# Patient Record
Sex: FEMALE | Race: WHITE | NOT HISPANIC OR LATINO | Employment: FULL TIME | URBAN - METROPOLITAN AREA
[De-identification: names, ages, dates, MRNs, and addresses within clinical notes are randomized per-mention and may not be internally consistent; named-entity substitution may affect disease eponyms.]

---

## 2017-01-03 DIAGNOSIS — F98.8 ADD (ATTENTION DEFICIT DISORDER): ICD-10-CM

## 2017-01-03 RX ORDER — LISDEXAMFETAMINE DIMESYLATE 70 MG/1
CAPSULE ORAL
Qty: 30 CAPSULE | Refills: 0 | Status: SHIPPED | OUTPATIENT
Start: 2017-01-03 | End: 2017-02-23 | Stop reason: SDUPTHER

## 2017-01-18 ENCOUNTER — PATIENT MESSAGE (OUTPATIENT)
Dept: INTERNAL MEDICINE | Facility: CLINIC | Age: 37
End: 2017-01-18

## 2017-01-19 ENCOUNTER — TELEPHONE (OUTPATIENT)
Dept: INTERNAL MEDICINE | Facility: CLINIC | Age: 37
End: 2017-01-19

## 2017-01-19 ENCOUNTER — PATIENT MESSAGE (OUTPATIENT)
Dept: INTERNAL MEDICINE | Facility: CLINIC | Age: 37
End: 2017-01-19

## 2017-01-19 ENCOUNTER — LAB VISIT (OUTPATIENT)
Dept: LAB | Facility: HOSPITAL | Age: 37
End: 2017-01-19
Attending: FAMILY MEDICINE
Payer: COMMERCIAL

## 2017-01-19 DIAGNOSIS — R61 HYPERHIDROSIS: ICD-10-CM

## 2017-01-19 DIAGNOSIS — R61 HYPERHIDROSIS: Primary | ICD-10-CM

## 2017-01-19 LAB
T4 FREE SERPL-MCNC: 0.98 NG/DL
TSH SERPL DL<=0.005 MIU/L-ACNC: 2.63 UIU/ML

## 2017-01-19 PROCEDURE — 84439 ASSAY OF FREE THYROXINE: CPT

## 2017-01-19 PROCEDURE — 84443 ASSAY THYROID STIM HORMONE: CPT

## 2017-01-19 PROCEDURE — 36415 COLL VENOUS BLD VENIPUNCTURE: CPT | Mod: PO

## 2017-01-26 ENCOUNTER — OFFICE VISIT (OUTPATIENT)
Dept: INTERNAL MEDICINE | Facility: CLINIC | Age: 37
End: 2017-01-26
Payer: COMMERCIAL

## 2017-01-26 VITALS
HEART RATE: 88 BPM | BODY MASS INDEX: 37.83 KG/M2 | DIASTOLIC BLOOD PRESSURE: 80 MMHG | WEIGHT: 221.56 LBS | SYSTOLIC BLOOD PRESSURE: 120 MMHG | HEIGHT: 64 IN | TEMPERATURE: 97 F

## 2017-01-26 DIAGNOSIS — F98.8 ADD (ATTENTION DEFICIT DISORDER): Primary | ICD-10-CM

## 2017-01-26 DIAGNOSIS — F41.1 ANXIETY STATE: ICD-10-CM

## 2017-01-26 DIAGNOSIS — R61 HYPERHIDROSIS: ICD-10-CM

## 2017-01-26 PROCEDURE — 99999 PR PBB SHADOW E&M-EST. PATIENT-LVL III: CPT | Mod: PBBFAC,,, | Performed by: FAMILY MEDICINE

## 2017-01-26 PROCEDURE — 1159F MED LIST DOCD IN RCRD: CPT | Mod: S$GLB,,, | Performed by: FAMILY MEDICINE

## 2017-01-26 PROCEDURE — 99214 OFFICE O/P EST MOD 30 MIN: CPT | Mod: S$GLB,,, | Performed by: FAMILY MEDICINE

## 2017-01-26 RX ORDER — CYCLOBENZAPRINE HCL 10 MG
10 TABLET ORAL EVERY 6 HOURS PRN
Refills: 0 | COMMUNITY
Start: 2016-12-28 | End: 2017-07-27 | Stop reason: SDUPTHER

## 2017-01-26 RX ORDER — VENLAFAXINE 75 MG/1
TABLET ORAL
Qty: 90 TABLET | Refills: 5 | Status: SHIPPED | OUTPATIENT
Start: 2017-01-26 | End: 2017-10-10

## 2017-01-26 RX ORDER — OXYCODONE AND ACETAMINOPHEN 7.5; 325 MG/1; MG/1
1 TABLET ORAL EVERY 6 HOURS PRN
Refills: 0 | COMMUNITY
Start: 2016-12-28 | End: 2017-01-26
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
## 2017-02-23 DIAGNOSIS — F98.8 ADD (ATTENTION DEFICIT DISORDER): ICD-10-CM

## 2017-02-23 RX ORDER — LISDEXAMFETAMINE DIMESYLATE 70 MG/1
CAPSULE ORAL
Qty: 30 CAPSULE | Refills: 0 | Status: SHIPPED | OUTPATIENT
Start: 2017-02-23 | End: 2017-03-24 | Stop reason: SDUPTHER

## 2017-03-14 ENCOUNTER — OFFICE VISIT (OUTPATIENT)
Dept: URGENT CARE | Facility: CLINIC | Age: 37
End: 2017-03-14
Payer: COMMERCIAL

## 2017-03-14 VITALS
DIASTOLIC BLOOD PRESSURE: 80 MMHG | TEMPERATURE: 98 F | WEIGHT: 228.38 LBS | SYSTOLIC BLOOD PRESSURE: 130 MMHG | HEART RATE: 98 BPM | OXYGEN SATURATION: 98 % | BODY MASS INDEX: 39.2 KG/M2

## 2017-03-14 DIAGNOSIS — A08.4 VIRAL GASTROENTERITIS: Primary | ICD-10-CM

## 2017-03-14 DIAGNOSIS — R11.2 NAUSEA VOMITING AND DIARRHEA: ICD-10-CM

## 2017-03-14 DIAGNOSIS — R10.32 LEFT LOWER QUADRANT PAIN: ICD-10-CM

## 2017-03-14 DIAGNOSIS — R19.7 NAUSEA VOMITING AND DIARRHEA: ICD-10-CM

## 2017-03-14 PROCEDURE — 1160F RVW MEDS BY RX/DR IN RCRD: CPT | Mod: S$GLB,,, | Performed by: NURSE PRACTITIONER

## 2017-03-14 PROCEDURE — 99999 PR PBB SHADOW E&M-EST. PATIENT-LVL III: CPT | Mod: PBBFAC,,, | Performed by: NURSE PRACTITIONER

## 2017-03-14 PROCEDURE — 99213 OFFICE O/P EST LOW 20 MIN: CPT | Mod: S$GLB,,, | Performed by: NURSE PRACTITIONER

## 2017-03-14 PROCEDURE — S0119 ONDANSETRON 4 MG: HCPCS | Mod: S$GLB,,, | Performed by: NURSE PRACTITIONER

## 2017-03-14 RX ORDER — PROMETHAZINE HYDROCHLORIDE 25 MG/1
25 TABLET ORAL EVERY 4 HOURS PRN
Qty: 20 TABLET | Refills: 0 | Status: SHIPPED | OUTPATIENT
Start: 2017-03-14 | End: 2017-10-10

## 2017-03-14 RX ORDER — ONDANSETRON 4 MG/1
4 TABLET, ORALLY DISINTEGRATING ORAL
Status: COMPLETED | OUTPATIENT
Start: 2017-03-14 | End: 2017-03-14

## 2017-03-14 RX ADMIN — ONDANSETRON 4 MG: 4 TABLET, ORALLY DISINTEGRATING ORAL at 09:03

## 2017-03-14 NOTE — MR AVS SNAPSHOT
[facility]  [address]  [place] [address]  Phone: [phone]  Fax: [phone]                  Neeru Gomez   3/14/2017 8:50 AM   Office Visit    Description:  Female : 1980   Provider:  XIOMARA Blanco   Department:  [facility]           Reason for Visit     Abdominal Pain           Diagnoses this Visit        Comments    Viral gastroenteritis    -  Primary     Nausea vomiting and diarrhea         Left lower quadrant pain                To Do List           Future Appointments        Provider Department Dept Phone    2017 2:00 PM [facility] [phone]      Goals (5 Years of Data)     None      Follow-Up and Disposition     Return if symptoms worsen or fail to improve.       These Medications        Disp Refills Start End    promethazine (PHENERGAN) 25 MG tablet 20 tablet 0 3/14/2017     Take 1 tablet (25 mg total) by mouth every 4 (four) hours as needed for Nausea. - Oral    Pharmacy: [facility] [place]  [address] [address]  #: [phone]         [facility] On Call     [facility] On Call Nurse Care Line -  Assistance  Registered nurses in the [facility] On Call Center provide clinical advisement, health education, appointment booking, and other advisory services.  Call for this free service at [phone].             Medications           Message regarding Medications     Verify the changes and/or additions to your medication regime listed below are the same as discussed with your clinician today.  If any of these changes or additions are incorrect, please notify your healthcare provider.        START taking these NEW medications        Refills    promethazine (PHENERGAN) 25 MG tablet 0    Sig: Take 1 tablet (25 mg total) by mouth every 4 (four) hours as needed for Nausea.    Class: Normal    Route: Oral      These medications were  administered today        Dose Freq    ondansetron disintegrating tablet 4 mg 4 mg Clinic/HOD 1 time    Sig: Take 1 tablet (4 mg total) by mouth one time.    Class: Normal    Route: Oral      STOP taking these medications     promethazine-dextromethorphan (PROMETHAZINE-DM) 6.25-15 mg/5 mL Syrp Take 5 mLs by mouth every 6 (six) hours as needed.           Verify that the below list of medications is an accurate representation of the medications you are currently taking.  If none reported, the list may be blank. If incorrect, please contact your healthcare provider. Carry this list with you in case of emergency.           Current Medications     albuterol (ACCUNEB) 0.63 mg/3 mL Nebu Take 3 mLs (0.63 mg total) by nebulization every 6 (six) hours as needed.    cyclobenzaprine (FLEXERIL) 10 MG tablet Take 10 mg by mouth every 6 (six) hours as needed.    fluticasone-salmeterol 250-50 mcg/dose (ADVAIR DISKUS) 250-50 mcg/dose diskus inhaler Inhale 1 puff into the lungs 2 (two) times daily.    gabapentin (NEURONTIN) 300 MG capsule take 1 capsule at bedtime    montelukast (SINGULAIR) 10 mg tablet Take 1 tablet (10 mg total) by mouth once daily.    promethazine (PHENERGAN) 25 MG tablet Take 1 tablet (25 mg total) by mouth every 4 (four) hours as needed for Nausea.    tramadol (ULTRAM) 50 mg tablet Take 1 tablet (50 mg total) by mouth every 6 (six) hours as needed for Pain.    triamcinolone (NASACORT) 55 mcg nasal inhaler 2 sprays by Nasal route once daily.    venlafaxine (EFFEXOR) 75 MG tablet 2 tabs po AM and 1 tab po midday    VYVANSE 70 mg capsule take 1 capsule daily           Clinical Reference Information           Your Vitals Were     BP Pulse Temp    130/80 (BP Location: Left arm, Patient Position: Sitting, BP Method: Manual) 98 97.6 °F (36.4 °C) (Tympanic)    Weight SpO2 BMI    103.6 kg (228 lb 6.3 oz) 98% 39.2 kg/m2      Blood Pressure          Most Recent Value    BP  130/80      Allergies as of 3/14/2017      Hydrocodone      Immunizations Administered on Date of Encounter - 3/14/2017     None      Instructions    Follow clear liquid diet guidelines, may progress to bland diet as tolerated.   Drink a small sips of clear liquids every 15-20 minutes to re-hydrate yourself.    See PCP or go to ER if decreased urination, inability to hold down fluids, if any dizziness or lightheadedness occurs, or if symptoms worsen of fail to improve with treatment.        Diet for Vomiting or Diarrhea (Adult)    Your symptoms may return or get worse after eating certain foods listed below. If this happens, stop eating these foods until your symptoms ease and you feel better.  Once the vomiting stops, follow the steps below.   During the first 12 to 24 hours  During the first 12 to 24 hours, follow this diet:  · Drinks. Plain water, sport drinks like electrolyte solutions, soft drinks without caffeine, mineral water (plain or flavored), clear fruit juices, and decaffeinated tea and coffee.  · Soups. Clear broth.  · Desserts. Plain gelatin, popsicles, and fruit juice bars. As you feel better, you may add 6 to 8 ounces of yogurt per day. If you have diarrhea, don't have foods or drinks that contain sugar, high-fructose corn syrup, or sugar alcohols.  During the next 24 hours  During the next 24 hours you may add the following to the above:  · Hot cereal, plain toast, bread, rolls, and crackers  · Plain noodles, rice, mashed potatoes, and chicken noodle or rice soup  · Unsweetened canned fruit (but not pineapple) and bananas  Don't eat more than 15 grams of fat a day. Do this by staying away from margarine, butter, oils, mayonnaise, sauces, gravies, fried foods, peanut butter, meat, poultry, and fish.  Don't eat much fiber. Stay away from raw or cooked vegetables, fresh fruits (except bananas), and bran cereals.  Limit how much caffeine and chocolate you have. Do not use any spices or seasonings except salt.  During the next 24 hours  Slowly  go back to your normal diet, as you feel better and your symptoms ease.  Date Last Reviewed: 8/1/2016  © 2557-5712 [employer]. [address] [place] [address], [place] [address]. All rights reserved. This information is not intended as a substitute for professional medical care. Always follow your healthcare professional's instructions.             Language Assistance Services     ATTENTION: Language assistance services are available, free of charge. Please call [phone].      ATENCIÓN: Si habla reggie, tiene a gonsalves disposición servicios gratuitos de asistencia lingüística. Llame al [phone].     CHÚ Ý: N?u b?n nói Ti?ng Vi?t, có các d?ch v? h? tr? ngôn ng? mi?n phí dành cho b?n. G?i s? [phone].         [facility] complies with applicable Federal civil rights laws and does not discriminate on the basis of race, color, national origin, age, disability, or sex.

## 2017-03-14 NOTE — PROGRESS NOTES
Subjective:       Patient ID: Neeru Gomez is a 36 y.o. female.    Chief Complaint: Abdominal Pain    HPI Comments: Patient started 2 days ago with nausea, vomiting, and diarrhea. 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 Symptoms started shortly after eating a salad from [employer]. No recent travel. No recent antibiotics. Last vomit was 8am. Last diarrhea was 8am also. She has decreased appetite and pain to the LLQ. She tried otc diarrhea medications with no improvement. No fever. Patient has a hx of hemorrhoids after her last pregnancy and feels that her hemorrhoids are flaring. She did see small amount of blood in her stool last night from her hemorrhoids. She uses otc preparation H with improvement.       /80 (BP Location: Left arm, Patient Position: Sitting, BP Method: Manual)  Pulse 98  Temp 97.6 °F (36.4 °C) (Tympanic)   Wt 103.6 kg (228 lb 6.3 oz)  SpO2 98%  BMI 39.2 kg/m2    Review of Systems   Constitutional: Positive for activity change, appetite change and fatigue. Negative for chills, diaphoresis, fever and unexpected weight change.   HENT: Negative.    Respiratory: Negative for cough and shortness of breath.    Cardiovascular: Negative for chest pain, palpitations and leg swelling.   Gastrointestinal: Positive for abdominal pain, blood in stool, diarrhea, nausea, rectal pain and vomiting. Negative for abdominal distention, anal bleeding and constipation.   Genitourinary: Negative.    Musculoskeletal: Negative.    Skin: Negative for color change, pallor, rash and wound.   Allergic/Immunologic: Negative for immunocompromised state.   Neurological: Negative.  Negative for dizziness and facial asymmetry.   Hematological: Negative for adenopathy. Does not bruise/bleed easily.   Psychiatric/Behavioral: Negative for agitation, behavioral problems and confusion.       Objective:      Physical Exam   Constitutional: She appears well-developed and well-nourished. She  is cooperative. No distress.   HENT:   Head: Normocephalic and atraumatic.   Cardiovascular: Normal rate, regular rhythm and normal heart sounds.    No murmur heard.  Pulmonary/Chest: Effort normal and breath sounds normal. No respiratory distress.   Abdominal: Normal appearance. She exhibits no shifting dullness, no distension, no pulsatile liver, no fluid wave, no abdominal bruit, no ascites, no pulsatile midline mass and no mass. Bowel sounds are increased. There is tenderness in the left lower quadrant. There is no rigidity, no rebound, no guarding, no CVA tenderness, no tenderness at McBurney's point and negative Granger's sign.   Musculoskeletal: Normal range of motion.   Neurological: She is alert.   Skin: Skin is warm and dry. No rash noted. She is not diaphoretic.   Psychiatric: She has a normal mood and affect. Her behavior is normal. Judgment and thought content normal.   Nursing note and vitals reviewed.      Assessment:       1. Viral gastroenteritis    2. Nausea vomiting and diarrhea    3. Left lower quadrant pain        Plan:       Neeru Paul was seen today for abdominal pain.    Diagnoses and all orders for this visit:    Viral gastroenteritis  -     ondansetron disintegrating tablet 4 mg; Take 1 tablet (4 mg total) by mouth one time.  -     promethazine (PHENERGAN) 25 MG tablet; Take 1 tablet (25 mg total) by mouth every 4 (four) hours as needed for Nausea.    Nausea vomiting and diarrhea  -     ondansetron disintegrating tablet 4 mg; Take 1 tablet (4 mg total) by mouth one time.  -     promethazine (PHENERGAN) 25 MG tablet; Take 1 tablet (25 mg total) by mouth every 4 (four) hours as needed for Nausea.    Left lower quadrant pain  [place] diet  Fluids  Diet handout given  ER if LLQ abdominal pain worsens or fever, discussed diverticulitis but risk factors are low. Patient wants to monitor pain, push fluids, and will go to ER if pain worsens.

## 2017-03-14 NOTE — PATIENT INSTRUCTIONS
Follow clear liquid diet guidelines, may progress to bland diet as tolerated.   Drink a small sips of clear liquids every 15-20 minutes to re-hydrate yourself.    See PCP or go to ER if decreased urination, inability to hold down fluids, if any dizziness or lightheadedness occurs, or if symptoms worsen of fail to improve with treatment.        Diet for Vomiting or Diarrhea (Adult)    Your symptoms may return or get worse after eating certain foods listed below. If this happens, stop eating these foods until your symptoms ease and you feel better.  Once the vomiting stops, follow the steps below.   During the first 12 to 24 hours  During the first 12 to 24 hours, follow this diet:  · Drinks. Plain water, sport drinks like electrolyte solutions, soft drinks without caffeine, mineral water (plain or flavored), clear fruit juices, and decaffeinated tea and coffee.  · Soups. Clear broth.  · Desserts. Plain gelatin, popsicles, and fruit juice bars. As you feel better, you may add 6 to 8 ounces of yogurt per day. If you have diarrhea, don't have foods or drinks that contain sugar, high-fructose corn syrup, or sugar alcohols.  During the next 24 hours  During the next 24 hours you may add the following to the above:  · Hot cereal, plain toast, bread, rolls, and crackers  · Plain noodles, rice, mashed potatoes, and chicken noodle or rice soup  · Unsweetened canned fruit (but not pineapple) and bananas  Don't eat more than 15 grams of fat a day. Do this by staying away from margarine, butter, oils, mayonnaise, sauces, gravies, fried foods, peanut butter, meat, poultry, and fish.  Don't eat much fiber. Stay away from raw or cooked vegetables, fresh fruits (except bananas), and bran cereals.  Limit how much caffeine and chocolate you have. Do not use any spices or seasonings except salt.  During the next 24 hours  Slowly go back to your normal diet, as you feel better and your symptoms ease.  Date Last Reviewed: 8/1/2016  ©  1830-0960 [employer]. [address] [place] [address], [place] [address]. All rights reserved. This information is not intended as a substitute for professional medical care. Always follow your healthcare professional's instructions.

## 2017-03-24 DIAGNOSIS — F98.8 ADD (ATTENTION DEFICIT DISORDER): ICD-10-CM

## 2017-03-24 RX ORDER — LISDEXAMFETAMINE DIMESYLATE 70 MG/1
CAPSULE ORAL
Qty: 30 CAPSULE | Refills: 0 | Status: SHIPPED | OUTPATIENT
Start: 2017-03-24 | End: 2017-05-03 | Stop reason: SDUPTHER

## 2017-05-03 ENCOUNTER — OFFICE VISIT (OUTPATIENT)
Dept: URGENT CARE | Facility: CLINIC | Age: 37
End: 2017-05-03
Payer: COMMERCIAL

## 2017-05-03 VITALS
HEIGHT: 64 IN | BODY MASS INDEX: 38.42 KG/M2 | TEMPERATURE: 100 F | HEART RATE: 98 BPM | RESPIRATION RATE: 18 BRPM | OXYGEN SATURATION: 98 % | WEIGHT: 225.06 LBS | DIASTOLIC BLOOD PRESSURE: 82 MMHG | SYSTOLIC BLOOD PRESSURE: 122 MMHG

## 2017-05-03 DIAGNOSIS — F98.8 ADD (ATTENTION DEFICIT DISORDER): ICD-10-CM

## 2017-05-03 DIAGNOSIS — J02.0 STREP PHARYNGITIS: ICD-10-CM

## 2017-05-03 DIAGNOSIS — J02.9 SORE THROAT: Primary | ICD-10-CM

## 2017-05-03 LAB
CTP QC/QA: YES
CTP QC/QA: YES
FLUAV AG NPH QL: NEGATIVE
FLUBV AG NPH QL: NEGATIVE
S PYO RRNA THROAT QL PROBE: POSITIVE

## 2017-05-03 PROCEDURE — 1160F RVW MEDS BY RX/DR IN RCRD: CPT | Mod: S$GLB,,, | Performed by: NURSE PRACTITIONER

## 2017-05-03 PROCEDURE — 87804 INFLUENZA ASSAY W/OPTIC: CPT | Mod: QW,S$GLB,, | Performed by: NURSE PRACTITIONER

## 2017-05-03 PROCEDURE — 87880 STREP A ASSAY W/OPTIC: CPT | Mod: QW,S$GLB,, | Performed by: NURSE PRACTITIONER

## 2017-05-03 PROCEDURE — 99214 OFFICE O/P EST MOD 30 MIN: CPT | Mod: 25,S$GLB,, | Performed by: NURSE PRACTITIONER

## 2017-05-03 RX ORDER — LISDEXAMFETAMINE DIMESYLATE 70 MG/1
70 CAPSULE ORAL DAILY
Qty: 30 CAPSULE | Refills: 0 | Status: SHIPPED | OUTPATIENT
Start: 2017-05-03 | End: 2017-07-27 | Stop reason: SDUPTHER

## 2017-05-03 RX ORDER — PENICILLIN V POTASSIUM 500 MG/1
500 TABLET, FILM COATED ORAL 2 TIMES DAILY
Qty: 20 TABLET | Refills: 0 | Status: SHIPPED | OUTPATIENT
Start: 2017-05-03 | End: 2017-05-13

## 2017-05-03 RX ORDER — IBUPROFEN 800 MG/1
800 TABLET ORAL 3 TIMES DAILY
Qty: 30 TABLET | Refills: 0 | Status: SHIPPED | OUTPATIENT
Start: 2017-05-03 | End: 2017-05-13

## 2017-05-03 NOTE — PATIENT INSTRUCTIONS
Pharyngitis: Strep (Confirmed)       You have had a positive test for strep throat. Strep throat is a contagious illness. It is spread by coughing, kissing or by touching others after touching your mouth or nose. Symptoms include throat pain which is worse with swallowing, aching all over, headache and fever. It is treated with antibiotic medication. This should help you start to feel better within 1-2 days.  Home care  · Rest at home. Drink plenty of fluids to avoid dehydration.  · No work or school for the first 2 days of taking the antibiotics. After this time, you will not be contagious. You can then return to school or work if you are feeling better.   · The antibiotic medication must be taken for the full 10 days, even if you feel better. This is very important to ensure the infection is treated. It is also important to prevent drug-resistent organisms from developing. If you were given an antibiotic shot, no more antibiotics are needed.  · You may use acetaminophen (Tylenol) or ibuprofen (Motrin, Advil) to control pain or fever, unless another medicine was prescribed for this. (NOTE: If you have chronic liver or kidney disease or ever had a stomach ulcer or GI bleeding, talk with your doctor before using these medicines.)  · Throat lozenges or sprays (such as Chloraseptic) help reduce pain. Gargling with warm salt water will also reduce throat pain. Dissolve 1/2 teaspoon of salt in 1 glass of warm water. This may be useful just before meals.   · Soft foods are okay. Avoid salty or spicy foods.  Follow-up care  Follow up with your healthcare provider or our staff if you are not improving over the next week.  When to seek medical advice  Call your healthcare provider right away if any of these occur:  · Fever as directed by your doctor   · New or worsening ear pain, sinus pain, or headache  · Painful lumps in the back of neck  · Stiff neck  · Lymph nodes are getting larger or becoming soft in the  middle  · Inability to swallow liquids, excessive drooling, or inability to open mouth wide due to throat pain  · Signs of dehydration (very dark urine or no urine, sunken eyes, dizziness)  · Trouble breathing or noisy breathing  · Muffled voice  · New rash  Date Last Reviewed: 4/13/2015  © 2073-4487 [employer]. [address] [place] [address], [place] [address]. All rights reserved. This information is not intended as a substitute for professional medical care. Always follow your healthcare professional's instructions.

## 2017-05-03 NOTE — MR AVS SNAPSHOT
[facility]  [address] [facility] [address]  Phone: [phone]  Fax: [phone]                  Neeru Gomez   5/3/2017 10:10 AM   Office Visit    Description:  Female : 1980   Provider:  Maria De Jesus Mckeon NP   Department:  [facility]           Reason for Visit     Sore Throat     Otalgia           Diagnoses this Visit        Comments    Sore throat    -  Primary     Strep pharyngitis     Contagious for 24 hours after the start of antibiotics.  Discard tooth brush.  Take medications until completed.             To Do List           Future Appointments        Provider Department Dept Phone    2017 2:00 PM [phone]      Goals (5 Years of Data)     None       These Medications        Disp Refills Start End    penicillin v potassium (VEETID) 500 MG tablet 20 tablet 0 5/3/2017 2017    Take 1 tablet (500 mg total) by mouth 2 (two) times daily. - Oral    [facility] [address] Ph #: [phone]       ibuprofen (ADVIL,MOTRIN) 800 MG tablet 30 tablet 0 5/3/2017 2017    Take 1 tablet (800 mg total) by mouth 3 (three) times daily. Take with food. - Oral    [facility] [address] Ph #: [phone]         [facility]     [facility] -  Assistance  Unless otherwise directed by your provider, please contact [facility], our nurse care line that is available for  assistance.     Registered nurses in the [facility] provide: appointment scheduling, clinical advisement, health education, and other advisory services.  Call: [phone] (toll free)               Medications           Message regarding Medications     Verify the changes and/or additions to your medication regime listed below are  the same as discussed with your clinician today.  If any of these changes or additions are incorrect, please notify your healthcare provider.        START taking these NEW medications        Refills    penicillin v potassium (VEETID) 500 MG tablet 0    Sig: Take 1 tablet (500 mg total) by mouth 2 (two) times daily.    Class: Normal    Route: Oral    ibuprofen (ADVIL,MOTRIN) 800 MG tablet 0    Sig: Take 1 tablet (800 mg total) by mouth 3 (three) times daily. Take with food.    Class: Normal    Route: Oral           Verify that the below list of medications is an accurate representation of the medications you are currently taking.  If none reported, the list may be blank. If incorrect, please contact your healthcare provider. Carry this list with you in case of emergency.           Current Medications     albuterol (ACCUNEB) 0.63 mg/3 mL Nebu Take 3 mLs (0.63 mg total) by nebulization every 6 (six) hours as needed.    cyclobenzaprine (FLEXERIL) 10 MG tablet Take 10 mg by mouth every 6 (six) hours as needed.    fluticasone-salmeterol 250-50 mcg/dose (ADVAIR DISKUS) 250-50 mcg/dose diskus inhaler Inhale 1 puff into the lungs 2 (two) times daily.    gabapentin (NEURONTIN) 300 MG capsule take 1 capsule at bedtime    montelukast (SINGULAIR) 10 mg tablet Take 1 tablet (10 mg total) by mouth once daily.    tramadol (ULTRAM) 50 mg tablet Take 1 tablet (50 mg total) by mouth every 6 (six) hours as needed for Pain.    venlafaxine (EFFEXOR) 75 MG tablet 2 tabs po AM and 1 tab po midday    VYVANSE 70 mg capsule TAKE 1 CAPSULE BY MOUTH DAILY    ibuprofen (ADVIL,MOTRIN) 800 MG tablet Take 1 tablet (800 mg total) by mouth 3 (three) times daily. Take with food.    penicillin v potassium (VEETID) 500 MG tablet Take 1 tablet (500 mg total) by mouth 2 (two) times daily.    promethazine (PHENERGAN) 25 MG tablet Take 1 tablet (25 mg total) by mouth every 4 (four) hours as needed for Nausea.    triamcinolone (NASACORT) 55 mcg nasal inhaler  "2 sprays by Nasal route once daily.           Clinical Reference Information           Your Vitals Were     BP Pulse Temp Resp Height Weight    122/82 (BP Location: Left arm, Patient Position: Sitting) 98 100.1 °F (37.8 °C) (Tympanic) 18 5' 4" (1.626 m) 102.1 kg (225 lb 1.4 oz)    SpO2 BMI             98% 38.64 kg/m2         Blood Pressure          Most Recent Value    BP  122/82      Allergies as of 5/3/2017     Hydrocodone      Immunizations Administered on Date of Encounter - 5/3/2017     None      Orders Placed During Today's Visit      Normal Orders This Visit    POCT Influenza A/B     POCT Rapid Strep A          5/3/2017 10:38 AM - Afshan Estrada LPN      Component Results     Component Value Flag Ref Range Units Status    Rapid Strep A Screen Positive (A) Negative  Final     Acceptable Yes    Final            Instructions      Pharyngitis: Strep (Confirmed)       You have had a positive test for strep throat. Strep throat is a contagious illness. It is spread by coughing, kissing or by touching others after touching your mouth or nose. Symptoms include throat pain which is worse with swallowing, aching all over, headache and fever. It is treated with antibiotic medication. This should help you start to feel better within 1-2 days.  Home care  · Rest at home. Drink plenty of fluids to avoid dehydration.  · No work or school for the first 2 days of taking the antibiotics. After this time, you will not be contagious. You can then return to school or work if you are feeling better.   · The antibiotic medication must be taken for the full 10 days, even if you feel better. This is very important to ensure the infection is treated. It is also important to prevent drug-resistent organisms from developing. If you were given an antibiotic shot, no more antibiotics are needed.  · You may use acetaminophen (Tylenol) or ibuprofen (Motrin, Advil) to control pain or fever, unless another " medicine was prescribed for this. (NOTE: If you have chronic liver or kidney disease or ever had a stomach ulcer or GI bleeding, talk with your doctor before using these medicines.)  · Throat lozenges or sprays (such as Chloraseptic) help reduce pain. Gargling with warm salt water will also reduce throat pain. Dissolve 1/2 teaspoon of salt in 1 glass of warm water. This may be useful just before meals.   · Soft foods are okay. Avoid salty or spicy foods.  Follow-up care  Follow up with your healthcare provider or our staff if you are not improving over the next week.  When to seek medical advice  Call your healthcare provider right away if any of these occur:  · Fever as directed by your doctor   · New or worsening ear pain, sinus pain, or headache  · Painful lumps in the back of neck  · Stiff neck  · Lymph nodes are getting larger or becoming soft in the middle  · Inability to swallow liquids, excessive drooling, or inability to open mouth wide due to throat pain  · Signs of dehydration (very dark urine or no urine, sunken eyes, dizziness)  · Trouble breathing or noisy breathing  · Muffled voice  · New rash  Date Last Reviewed: 4/13/2015  © 9050-1194 [employer]. [address] [place] [address]. All rights reserved. This information is not intended as a substitute for professional medical care. Always follow your healthcare professional's instructions.             Language Assistance Services     ATTENTION: Language assistance services are available, free of charge. Please call [phone].      ATENCIÓN: Si habla español, tiene a gonsalves disposición servicios gratuitos de asistencia lingüística. Llame al [phone].     [facility] Ý: N?u b?n nói Ti?ng Vi?t, có các d?ch v? h? tr? ngôn ng? mi?n phí dành cho b?n. G?i s? [phone].         [facility] complies with applicable Federal civil rights laws and does not discriminate on the basis of race, color, national origin, age,  disability, or sex.

## 2017-05-03 NOTE — PROGRESS NOTES
CHIEF COMPLAINT/REASON FOR VISIT: Sore throat     HISTORY OF PRESENT ILLNESS:    Neeru Gomez 36 y.o. year-old  female complains of sore throat that started 2 days ago.  Patient also reports bilateral ear pain, productive cough, fever, and body aches.      Patient admits tried Nyquil medications with no relief.     PAST MEDICAL HISTORY:   Past Medical History:   Diagnosis Date    ADD (attention deficit disorder)     diagnosed in  by Clinical Psychologist    Anxiety     Intermittent asthma     OA (osteoarthritis) of knee          PAST SURGICAL HISTORY:.  Past Surgical History:   Procedure Laterality Date    APPENDECTOMY       SECTION      HYSTERECTOMY      KNEE SURGERY      5 in total    nerve ablation      TONSILLECTOMY           SOCIAL HISTORY:.  Social History     Social History    Marital status:      Spouse name: N/A    Number of children: 3    Years of education: N/A     Occupational History    teacher [employer]     Social History Main Topics    Smoking status: Never Smoker    Smokeless tobacco: Not on file    Alcohol use 0.0 oz/week     0 Standard drinks or equivalent per week      Comment: occassional    Drug use: No    Sexual activity: Yes     Partners: Male     Other Topics Concern    Not on file     Social History Narrative       ROS:  GENERAL: Low grade fever, chills  SKIN: No rashes, itching or changes in color or texture of skin.   HEENT:  Reports sore  throat,  runny nose, nasal congestion  NODES: No masses or lesions. Denies swollen glands.   CHEST: Reports cough and sputum production.   CARDIOVASCULAR: Denies chest pain, shortness of breath.  ABDOMEN: Appetite fine. No weight loss. Denies diarrhea, abdominal pain, hematemesis or blood in stool.  MUSCULOSKELETAL: No joint stiffness or swelling. Denies back pain.  NEUROLOGIC: No history of seizures, paralysis, alteration of gait or coordination.  PSYCHIATRIC:  "Denies mood swings, depression or suicidal thoughts.    /82 (BP Location: Left arm, Patient Position: Sitting)  Pulse 98  Temp 100.1 °F (37.8 °C) (Tympanic)   Resp 18  Ht 5' 4" (1.626 m)  Wt 102.1 kg (225 lb 1.4 oz)  SpO2 98%  BMI 38.64 kg/m2    PE:   APPEARANCE: Well nourished, well developed, in mild distress.   SKIN: Normal skin turgor, no lesions.  HEENT: Turbinates enlarged and erythematous, pharynx erythematous. TMs poor light reflex bilateral.  Left ear effusion noted.    CHEST: Lungs clear to auscultation.  CARDIOVASCULAR: Regular rate and rhythm.  MENTAL STATUS: Patient alert, oriented x 3 & conversant.    PLAN:   Sore throat  -     POCT Influenza A/B  -     ibuprofen (ADVIL,MOTRIN) 800 MG tablet; Take 1 tablet (800 mg total) by mouth 3 (three) times daily. Take with food.  Dispense: 30 tablet; Refill: 0    Strep pharyngitis  Comments:  Contagious for 24 hours after the start of antibiotics.  Discard tooth brush.  Take medications until completed.    Orders:  -     POCT Rapid Strep A  -     penicillin v potassium (VEETID) 500 MG tablet; Take 1 tablet (500 mg total) by mouth 2 (two) times daily.  Dispense: 20 tablet; Refill: 0  -     ibuprofen (ADVIL,MOTRIN) 800 MG tablet; Take 1 tablet (800 mg total) by mouth 3 (three) times daily. Take with food.  Dispense: 30 tablet; Refill: 0        Normal saline nasal wash to irrigate sinuses and for congestion/runny nose.  Cool mist humidifier/vaporizer.  Practice good handwashing.  Tylenol or Ibuprofen for fever, headache and body aches.  Warm salt water gargles for throat comfort.  Chloraseptic spray or lozenges for throat comfort.  See PCP or go to ER if symptoms worsen or fail to improve with treatment.      Instructed to take all medications as ordered.  Informed if no improvement or symptoms worsens to follow up with primary care physician.  Informed to hydrate and rest.  Printed and review after visit summary with patient.  "

## 2017-07-27 ENCOUNTER — OFFICE VISIT (OUTPATIENT)
Dept: INTERNAL MEDICINE | Facility: CLINIC | Age: 37
End: 2017-07-27
Payer: COMMERCIAL

## 2017-07-27 VITALS
WEIGHT: 231.25 LBS | DIASTOLIC BLOOD PRESSURE: 78 MMHG | HEART RATE: 100 BPM | BODY MASS INDEX: 39.48 KG/M2 | HEIGHT: 64 IN | SYSTOLIC BLOOD PRESSURE: 122 MMHG | TEMPERATURE: 99 F

## 2017-07-27 DIAGNOSIS — F41.1 ANXIETY STATE: ICD-10-CM

## 2017-07-27 DIAGNOSIS — M19.90 OSTEOARTHRITIS, UNSPECIFIED OSTEOARTHRITIS TYPE, UNSPECIFIED SITE: ICD-10-CM

## 2017-07-27 DIAGNOSIS — Z00.00 ROUTINE GENERAL MEDICAL EXAMINATION AT A HEALTH CARE FACILITY: Primary | ICD-10-CM

## 2017-07-27 DIAGNOSIS — F98.8 ATTENTION DEFICIT DISORDER, UNSPECIFIED HYPERACTIVITY PRESENCE: ICD-10-CM

## 2017-07-27 DIAGNOSIS — G56.00 CARPAL TUNNEL SYNDROME, UNSPECIFIED LATERALITY: ICD-10-CM

## 2017-07-27 DIAGNOSIS — J45.20 ASTHMA, INTERMITTENT, UNCOMPLICATED: ICD-10-CM

## 2017-07-27 PROCEDURE — 99395 PREV VISIT EST AGE 18-39: CPT | Mod: S$GLB,,, | Performed by: FAMILY MEDICINE

## 2017-07-27 PROCEDURE — 99999 PR PBB SHADOW E&M-EST. PATIENT-LVL III: CPT | Mod: PBBFAC,,, | Performed by: FAMILY MEDICINE

## 2017-07-27 RX ORDER — MONTELUKAST SODIUM 10 MG/1
10 TABLET ORAL DAILY
Qty: 90 TABLET | Refills: 3 | Status: SHIPPED | OUTPATIENT
Start: 2017-07-27 | End: 2018-12-28 | Stop reason: SDUPTHER

## 2017-07-27 RX ORDER — LISDEXAMFETAMINE DIMESYLATE 70 MG/1
70 CAPSULE ORAL DAILY
Qty: 30 CAPSULE | Refills: 0 | Status: SHIPPED | OUTPATIENT
Start: 2017-07-27 | End: 2017-09-05 | Stop reason: SDUPTHER

## 2017-07-27 RX ORDER — DESVENLAFAXINE 100 MG/1
100 TABLET, EXTENDED RELEASE ORAL DAILY
Qty: 30 TABLET | Refills: 11 | Status: SHIPPED | OUTPATIENT
Start: 2017-07-27 | End: 2017-10-10 | Stop reason: SDUPTHER

## 2017-07-27 RX ORDER — CYCLOBENZAPRINE HCL 10 MG
10 TABLET ORAL 3 TIMES DAILY PRN
Qty: 30 TABLET | Refills: 2 | Status: SHIPPED | OUTPATIENT
Start: 2017-07-27

## 2017-07-27 RX ORDER — GABAPENTIN 300 MG/1
300 CAPSULE ORAL NIGHTLY
Qty: 90 CAPSULE | Refills: 3 | Status: SHIPPED | OUTPATIENT
Start: 2017-07-27 | End: 2018-03-14 | Stop reason: SDUPTHER

## 2017-07-27 NOTE — PROGRESS NOTES
Subjective:      Patient ID: Neeru Gomez is a 37 y.o. female.    Chief Complaint: Follow-up (6m) and Annual Exam    Is coming in today mainly for medication management as well as prevention exam.  She's noticing that she is sweating still but not as much before.  We reduce the dose down her Effexor to the immediate release at 150 mg in a.m. and 75 mg in the afternoon but she does report that occasionally she forgets the afternoon dose.  She like to try something different if we think it might help to reduce swelling.    From ADHD standpoint she's doing well with Vyvanse is 70 mg.  She's been tried before on Concerta and Adderall XR and positive the Vyvanse is working best for her.    She still trying to work on weight loss.    Asthma standpoint she's doing well with Singulair daily as well as Advair.  She's not had any flareups.    She ended up having had surgery on her left wrist.  She still periodically uses the Flexeril and the gabapentin at nighttime.  She like refills of this today.          Lab Results   Component Value Date    WBC 8.71 11/26/2014    HGB 14.7 11/26/2014    HCT 42.6 11/26/2014     11/26/2014    CHOL 200 (H) 11/26/2014    TRIG 121 11/26/2014    HDL 43 11/26/2014    ALT 17 09/09/2014    AST 16 09/09/2014     09/09/2014    K 5.0 09/09/2014     09/09/2014    CREATININE 0.7 09/09/2014    BUN 11 09/09/2014    CO2 30 (H) 09/09/2014    TSH 2.630 01/19/2017    HGBA1C 5.3 11/26/2014       Review of Systems   Constitutional: Negative for chills, fatigue and fever.   HENT: Negative for ear pain and trouble swallowing.    Eyes: Negative for pain and visual disturbance.   Respiratory: Negative for cough and shortness of breath.    Cardiovascular: Negative for chest pain and leg swelling.   Gastrointestinal: Negative for abdominal pain, blood in stool, nausea and vomiting.   Endocrine: Negative for cold intolerance and heat intolerance.        Sweating   Genitourinary: Negative for  dysuria and frequency.   Musculoskeletal: Positive for arthralgias. Negative for joint swelling, myalgias and neck pain.   Skin: Negative for color change and rash.   Neurological: Negative for dizziness and headaches.   Psychiatric/Behavioral: Negative for behavioral problems, decreased concentration, dysphoric mood and sleep disturbance. The patient is not nervous/anxious.      Objective:     Physical Exam   Constitutional: She appears well-developed and well-nourished.   HENT:   Head: Normocephalic and atraumatic.   Right Ear: Tympanic membrane normal.   Left Ear: Tympanic membrane normal.   Mouth/Throat: Oropharynx is clear and moist.   Eyes: Conjunctivae and EOM are normal.   Neck: Normal range of motion. Neck supple.   Cardiovascular: Normal rate and regular rhythm.    Pulmonary/Chest: Effort normal and breath sounds normal.   Psychiatric: She has a normal mood and affect. Her speech is normal and behavior is normal.   Nursing note and vitals reviewed.    Assessment:     1. Routine general medical examination at a health care facility    2. Attention deficit disorder, unspecified hyperactivity presence    3. Anxiety state    4. Asthma, intermittent, uncomplicated    5. Carpal tunnel syndrome, unspecified laterality    6. Osteoarthritis, unspecified osteoarthritis type, unspecified site      Plan:   Neeru Paul was seen today for follow-up and annual exam.    Diagnoses and all orders for this visit:    Routine general medical examination at a health care facility-labs ordered for tomorrow.  Discussed health maintenance issues with the patient.  -     TSH; Future  -     T4, free; Future  -     Lipid panel; Future  -     Hemoglobin A1c; Future  -     Comprehensive metabolic panel; Future  -     CBC auto differential; Future    Attention deficit disorder, unspecified hyperactivity presence-continue with medications refilled today  -     lisdexamfetamine (VYVANSE) 70 MG capsule; Take 1 capsule (70 mg total) by mouth  once daily.  -     TSH; Future  -     T4, free; Future  -     Lipid panel; Future  -     Hemoglobin A1c; Future  -     Comprehensive metabolic panel; Future  -     CBC auto differential; Future    Anxiety state-change from Effexor to Pristiq.  If too expensive for the patient will consider other options to help reduce sweating possibly off norepinephrine medication  -     TSH; Future  -     T4, free; Future  -     Lipid panel; Future  -     Hemoglobin A1c; Future  -     Comprehensive metabolic panel; Future  -     CBC auto differential; Future    Asthma, intermittent, uncomplicated-stable with Advair and Singulair  -     TSH; Future  -     T4, free; Future  -     Lipid panel; Future  -     Hemoglobin A1c; Future  -     Comprehensive metabolic panel; Future  -     CBC auto differential; Future    Carpal tunnel syndrome, unspecified laterality-refill gabapentin status post surgery    Osteoarthritis, unspecified osteoarthritis type, unspecified site-refill gabapentin and Flexeril.  Not currently using tramadol    Other orders  -     desvenlafaxine succinate (PRISTIQ) 100 MG Tb24; Take 100 mg by mouth once daily.  -     montelukast (SINGULAIR) 10 mg tablet; Take 1 tablet (10 mg total) by mouth once daily.  -     gabapentin (NEURONTIN) 300 MG capsule; Take 1 capsule (300 mg total) by mouth nightly.  -     cyclobenzaprine (FLEXERIL) 10 MG tablet; Take 1 tablet (10 mg total) by mouth 3 (three) times daily as needed.            Return in about 6 months (around 1/27/2018) for ADD.

## 2017-08-01 ENCOUNTER — LAB VISIT (OUTPATIENT)
Dept: LAB | Facility: HOSPITAL | Age: 37
End: 2017-08-01
Attending: FAMILY MEDICINE
Payer: COMMERCIAL

## 2017-08-01 DIAGNOSIS — F41.1 ANXIETY STATE: ICD-10-CM

## 2017-08-01 DIAGNOSIS — Z00.00 ROUTINE GENERAL MEDICAL EXAMINATION AT A HEALTH CARE FACILITY: ICD-10-CM

## 2017-08-01 DIAGNOSIS — F98.8 ATTENTION DEFICIT DISORDER, UNSPECIFIED HYPERACTIVITY PRESENCE: ICD-10-CM

## 2017-08-01 DIAGNOSIS — J45.20 ASTHMA, INTERMITTENT, UNCOMPLICATED: ICD-10-CM

## 2017-08-01 LAB
ALBUMIN SERPL BCP-MCNC: 3.9 G/DL
ALP SERPL-CCNC: 51 U/L
ALT SERPL W/O P-5'-P-CCNC: 11 U/L
ANION GAP SERPL CALC-SCNC: 10 MMOL/L
AST SERPL-CCNC: 15 U/L
BASOPHILS # BLD AUTO: 0 K/UL
BASOPHILS NFR BLD: 0 %
BILIRUB SERPL-MCNC: 0.4 MG/DL
BUN SERPL-MCNC: 11 MG/DL
CALCIUM SERPL-MCNC: 9.3 MG/DL
CHLORIDE SERPL-SCNC: 105 MMOL/L
CHOLEST/HDLC SERPL: 4.8 {RATIO}
CO2 SERPL-SCNC: 27 MMOL/L
CREAT SERPL-MCNC: 0.7 MG/DL
DIFFERENTIAL METHOD: ABNORMAL
EOSINOPHIL # BLD AUTO: 0 K/UL
EOSINOPHIL NFR BLD: 0 %
ERYTHROCYTE [DISTWIDTH] IN BLOOD BY AUTOMATED COUNT: 12.7 %
EST. GFR  (AFRICAN AMERICAN): >60 ML/MIN/1.73 M^2
EST. GFR  (NON AFRICAN AMERICAN): >60 ML/MIN/1.73 M^2
GLUCOSE SERPL-MCNC: 83 MG/DL
HCT VFR BLD AUTO: 41.4 %
HDL/CHOLESTEROL RATIO: 21 %
HDLC SERPL-MCNC: 214 MG/DL
HDLC SERPL-MCNC: 45 MG/DL
HGB BLD-MCNC: 13.8 G/DL
LDLC SERPL CALC-MCNC: 137.8 MG/DL
LYMPHOCYTES # BLD AUTO: 2 K/UL
LYMPHOCYTES NFR BLD: 30.6 %
MCH RBC QN AUTO: 31.4 PG
MCHC RBC AUTO-ENTMCNC: 33.3 G/DL
MCV RBC AUTO: 94 FL
MONOCYTES # BLD AUTO: 0.7 K/UL
MONOCYTES NFR BLD: 11.5 %
NEUTROPHILS # BLD AUTO: 3.7 K/UL
NEUTROPHILS NFR BLD: 57.7 %
NONHDLC SERPL-MCNC: 169 MG/DL
PLATELET # BLD AUTO: 347 K/UL
PMV BLD AUTO: 9.5 FL
POTASSIUM SERPL-SCNC: 5 MMOL/L
PROT SERPL-MCNC: 7 G/DL
RBC # BLD AUTO: 4.39 M/UL
SODIUM SERPL-SCNC: 142 MMOL/L
T4 FREE SERPL-MCNC: 0.87 NG/DL
TRIGL SERPL-MCNC: 156 MG/DL
TSH SERPL DL<=0.005 MIU/L-ACNC: 2.38 UIU/ML
WBC # BLD AUTO: 6.41 K/UL

## 2017-08-01 PROCEDURE — 36415 COLL VENOUS BLD VENIPUNCTURE: CPT | Mod: PO

## 2017-08-01 PROCEDURE — 84439 ASSAY OF FREE THYROXINE: CPT

## 2017-08-01 PROCEDURE — 80061 LIPID PANEL: CPT

## 2017-08-01 PROCEDURE — 83036 HEMOGLOBIN GLYCOSYLATED A1C: CPT

## 2017-08-01 PROCEDURE — 80053 COMPREHEN METABOLIC PANEL: CPT

## 2017-08-01 PROCEDURE — 84443 ASSAY THYROID STIM HORMONE: CPT

## 2017-08-01 PROCEDURE — 85025 COMPLETE CBC W/AUTO DIFF WBC: CPT

## 2017-08-02 LAB
ESTIMATED AVG GLUCOSE: 100 MG/DL
HBA1C MFR BLD HPLC: 5.1 %

## 2017-09-05 DIAGNOSIS — F98.8 ATTENTION DEFICIT DISORDER, UNSPECIFIED HYPERACTIVITY PRESENCE: ICD-10-CM

## 2017-09-05 RX ORDER — LISDEXAMFETAMINE DIMESYLATE 70 MG/1
CAPSULE ORAL
Qty: 30 CAPSULE | Refills: 0 | Status: SHIPPED | OUTPATIENT
Start: 2017-09-05 | End: 2018-01-16 | Stop reason: SDUPTHER

## 2017-10-10 ENCOUNTER — OFFICE VISIT (OUTPATIENT)
Dept: INTERNAL MEDICINE | Facility: CLINIC | Age: 37
End: 2017-10-10
Payer: COMMERCIAL

## 2017-10-10 VITALS
TEMPERATURE: 98 F | WEIGHT: 233.69 LBS | HEART RATE: 76 BPM | HEIGHT: 64 IN | DIASTOLIC BLOOD PRESSURE: 84 MMHG | SYSTOLIC BLOOD PRESSURE: 122 MMHG | BODY MASS INDEX: 39.9 KG/M2

## 2017-10-10 DIAGNOSIS — F41.1 GAD (GENERALIZED ANXIETY DISORDER): Primary | ICD-10-CM

## 2017-10-10 DIAGNOSIS — F33.1 MODERATE EPISODE OF RECURRENT MAJOR DEPRESSIVE DISORDER: ICD-10-CM

## 2017-10-10 DIAGNOSIS — J45.20 MILD INTERMITTENT ASTHMA WITHOUT COMPLICATION: ICD-10-CM

## 2017-10-10 DIAGNOSIS — R09.82 POSTNASAL DRIP: ICD-10-CM

## 2017-10-10 PROCEDURE — 90686 IIV4 VACC NO PRSV 0.5 ML IM: CPT | Mod: S$GLB,,, | Performed by: FAMILY MEDICINE

## 2017-10-10 PROCEDURE — 99999 PR PBB SHADOW E&M-EST. PATIENT-LVL III: CPT | Mod: PBBFAC,,, | Performed by: FAMILY MEDICINE

## 2017-10-10 PROCEDURE — 99214 OFFICE O/P EST MOD 30 MIN: CPT | Mod: 25,S$GLB,, | Performed by: FAMILY MEDICINE

## 2017-10-10 PROCEDURE — 90471 IMMUNIZATION ADMIN: CPT | Mod: S$GLB,,, | Performed by: FAMILY MEDICINE

## 2017-10-10 RX ORDER — ESCITALOPRAM OXALATE 10 MG/1
10 TABLET ORAL NIGHTLY
Qty: 90 TABLET | Refills: 3 | Status: SHIPPED | OUTPATIENT
Start: 2017-10-10 | End: 2018-10-22 | Stop reason: SDUPTHER

## 2017-10-10 RX ORDER — LORATADINE 10 MG/1
10 TABLET ORAL DAILY PRN
Qty: 90 TABLET | Refills: 3 | Status: SHIPPED | OUTPATIENT
Start: 2017-10-10 | End: 2018-12-28

## 2017-10-10 RX ORDER — LORAZEPAM 0.5 MG/1
0.5 TABLET ORAL EVERY 12 HOURS PRN
Qty: 15 TABLET | Refills: 0 | Status: SHIPPED | OUTPATIENT
Start: 2017-10-10 | End: 2018-07-17 | Stop reason: SDUPTHER

## 2017-10-10 RX ORDER — DESVENLAFAXINE SUCCINATE 50 MG/1
50 TABLET, EXTENDED RELEASE ORAL DAILY
Qty: 30 TABLET | Refills: 11 | Status: SHIPPED | OUTPATIENT
Start: 2017-10-10 | End: 2018-10-22 | Stop reason: SDUPTHER

## 2017-10-10 NOTE — PROGRESS NOTES
Subjective:      Patient ID: Neeru Gomez is a 37 y.o. female.    Chief Complaint: Anxiety; Asthma; and postnasal drip    Patient's coming in today because over the last week she's had 2 episodes where she felt like she was having a panic attack.  She was under a lot of stress and started having some chest pain felt like she can breathe.  Less than a night she went and took her asthma medicines which opened her up better.  It eventually subsided as she was able to breathe slower and it went away however on Tuesday did the same thing.  At that time she was at school.  She was under high stress.  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  Her  still without a job.  There is been a lot more demands on her as well.  Currently she is on the Pristiq at 100 mg which is helping and she is having less sweats as when she was preceded with the Effexor she was having a lot of profuse sweating.  She ultimately like the effects of the best with balancing her mood however she can handle the amount of sweating it was causing.  She feels like she might need to tweak the medicines again.  She has been based on our records also on Effexor, Celexa.  Patient also reports she was on Lexapro one point time as well.  At one point she is placed on Abilify and it helped however it causes significant amount weight gain.  She did have another episode at one point where she had to go the emergency room and received some Ativan at one point.  She isn't against using Ativan if it's necessary.  She's also on Vyvanse 70 for her ADD and this seems to be well controlled for her at this time.    From an asthma standpoint she's currently on the Advair and Singulair.  She's willing to do her flu shot today.  She feels that she still has a good bit of some postnasal drip at times.  She's interested in adding some Claritin if this may help as well.    I also reviewed her labs from August with  her.  She has normal thyroid normal cholesterol levels no signs of prediabetes normal CBC.        Lab Results   Component Value Date    WBC 6.41 08/01/2017    HGB 13.8 08/01/2017    HCT 41.4 08/01/2017     08/01/2017    CHOL 214 (H) 08/01/2017    TRIG 156 (H) 08/01/2017    HDL 45 08/01/2017    ALT 11 08/01/2017    AST 15 08/01/2017     08/01/2017    K 5.0 08/01/2017     08/01/2017    CREATININE 0.7 08/01/2017    BUN 11 08/01/2017    CO2 27 08/01/2017    TSH 2.382 08/01/2017    HGBA1C 5.1 08/01/2017       Review of Systems   Constitutional: Positive for activity change. Negative for appetite change, fatigue and unexpected weight change.   HENT: Positive for postnasal drip and rhinorrhea. Negative for congestion.    Respiratory: Positive for shortness of breath. Negative for cough and wheezing.    Cardiovascular: Positive for chest pain. Negative for palpitations and leg swelling.   Gastrointestinal: Negative for abdominal distention and abdominal pain.   Neurological: Negative for light-headedness and headaches.   Psychiatric/Behavioral: Positive for decreased concentration and dysphoric mood. Negative for sleep disturbance. The patient is nervous/anxious.      Objective:     Physical Exam   Constitutional: She appears well-developed and well-nourished.   HENT:   Head: Normocephalic and atraumatic.   Right Ear: Tympanic membrane normal.   Left Ear: Tympanic membrane normal.   Mouth/Throat: Uvula is midline and mucous membranes are normal. Posterior oropharyngeal erythema present. No posterior oropharyngeal edema.   Eyes: Conjunctivae and EOM are normal.   Neck: Normal range of motion. Neck supple.   Cardiovascular: Normal rate and regular rhythm.    Pulmonary/Chest: Effort normal and breath sounds normal.   Psychiatric: Her speech is normal and behavior is normal. Judgment and thought content normal. Her mood appears anxious. Cognition and memory are normal.   Nursing note and vitals  reviewed.    Assessment:     1. MYKE (generalized anxiety disorder)    2. Moderate episode of recurrent major depressive disorder    3. Postnasal drip    4. Mild intermittent asthma without complication      Plan:   Neeru Paul was seen today for anxiety, asthma and postnasal drip.    Diagnoses and all orders for this visit:    MYKE (generalized anxiety disorder)-worse lately with increased stress from school and has been not currently having job.  Currently on Pristiq 100 mg.  She will finish out what she has 100 mg.  Since there is no major added benefit of having the Pristiq at 100 mg compared to 50 we will decrease the pristiq when running out to 50mg. add in 5mg of lexapro at night x 1 week now, then go up to 10mg of lexapro at night. ativan prn panic attacks #15.  Patient can message me if she's having any worsening signs or symptoms.  Comments:      Moderate episode of recurrent major depressive disorder-improved with Pristiq however with more anxiety now adding Lexapro and decrease Pristiq for now    Postnasal drip-worse lately  Comments:  add in claritin daily with singulair.     Mild intermittent asthma without complication-stable with Advair and Singulair.  Only intermittent use of the Advair.  Obtain flu shot today.    Other orders  -     escitalopram oxalate (LEXAPRO) 10 MG tablet; Take 1 tablet (10 mg total) by mouth every evening.  -     desvenlafaxine succinate (PRISTIQ) 50 MG Tb24; Take 1 tablet (50 mg total) by mouth once daily.  -     loratadine (CLARITIN) 10 mg tablet; Take 1 tablet (10 mg total) by mouth daily as needed for Allergies.  -     lorazepam (ATIVAN) 0.5 MG tablet; Take 1 tablet (0.5 mg total) by mouth every 12 (twelve) hours as needed for Anxiety (panic attack).            Return if symptoms worsen or fail to improve.

## 2018-01-16 DIAGNOSIS — F98.8 ATTENTION DEFICIT DISORDER, UNSPECIFIED HYPERACTIVITY PRESENCE: ICD-10-CM

## 2018-01-16 RX ORDER — LISDEXAMFETAMINE DIMESYLATE 70 MG/1
CAPSULE ORAL
Qty: 30 CAPSULE | Refills: 0 | Status: SHIPPED | OUTPATIENT
Start: 2018-01-16 | End: 2018-01-31 | Stop reason: SDUPTHER

## 2018-01-22 ENCOUNTER — TELEPHONE (OUTPATIENT)
Dept: INTERNAL MEDICINE | Facility: CLINIC | Age: 38
End: 2018-01-22

## 2018-01-22 NOTE — TELEPHONE ENCOUNTER
----- Message from Cory Wang sent at 1/22/2018 12:01 PM CST -----  Contact: Pre- service - ying   States she's calling rg having nurse to call back rg needing to have some questions answered for a PA on pt's meds and can be reached at [phone]//thanks/dbw

## 2018-01-31 ENCOUNTER — PATIENT MESSAGE (OUTPATIENT)
Dept: INTERNAL MEDICINE | Facility: CLINIC | Age: 38
End: 2018-01-31

## 2018-01-31 DIAGNOSIS — F98.8 ATTENTION DEFICIT DISORDER, UNSPECIFIED HYPERACTIVITY PRESENCE: ICD-10-CM

## 2018-01-31 RX ORDER — LISDEXAMFETAMINE DIMESYLATE 70 MG/1
70 CAPSULE ORAL DAILY
Qty: 30 CAPSULE | Refills: 0 | Status: SHIPPED | OUTPATIENT
Start: 2018-01-31 | End: 2018-03-14 | Stop reason: SDUPTHER

## 2018-03-14 ENCOUNTER — OFFICE VISIT (OUTPATIENT)
Dept: INTERNAL MEDICINE | Facility: CLINIC | Age: 38
End: 2018-03-14
Payer: COMMERCIAL

## 2018-03-14 VITALS
SYSTOLIC BLOOD PRESSURE: 124 MMHG | WEIGHT: 240.94 LBS | HEIGHT: 64 IN | BODY MASS INDEX: 41.13 KG/M2 | DIASTOLIC BLOOD PRESSURE: 82 MMHG | TEMPERATURE: 98 F

## 2018-03-14 DIAGNOSIS — F98.8 ATTENTION DEFICIT DISORDER, UNSPECIFIED HYPERACTIVITY PRESENCE: ICD-10-CM

## 2018-03-14 DIAGNOSIS — J45.20 MILD INTERMITTENT ASTHMA WITHOUT COMPLICATION: ICD-10-CM

## 2018-03-14 DIAGNOSIS — Z00.00 WELLNESS EXAMINATION: ICD-10-CM

## 2018-03-14 DIAGNOSIS — F41.1 ANXIETY STATE: Primary | ICD-10-CM

## 2018-03-14 DIAGNOSIS — R63.5 ABNORMAL WEIGHT GAIN: ICD-10-CM

## 2018-03-14 DIAGNOSIS — M19.90 OSTEOARTHRITIS, UNSPECIFIED OSTEOARTHRITIS TYPE, UNSPECIFIED SITE: ICD-10-CM

## 2018-03-14 PROCEDURE — 99999 PR PBB SHADOW E&M-EST. PATIENT-LVL II: CPT | Mod: PBBFAC,,, | Performed by: FAMILY MEDICINE

## 2018-03-14 PROCEDURE — 99214 OFFICE O/P EST MOD 30 MIN: CPT | Mod: S$GLB,,, | Performed by: FAMILY MEDICINE

## 2018-03-14 RX ORDER — ALBUTEROL SULFATE 90 UG/1
2 AEROSOL, METERED RESPIRATORY (INHALATION) EVERY 6 HOURS PRN
COMMUNITY
End: 2018-03-14 | Stop reason: SDUPTHER

## 2018-03-14 RX ORDER — FLUTICASONE PROPIONATE AND SALMETEROL 250; 50 UG/1; UG/1
1 POWDER RESPIRATORY (INHALATION) 2 TIMES DAILY
Qty: 60 EACH | Refills: 11 | Status: SHIPPED | OUTPATIENT
Start: 2018-03-14 | End: 2018-11-27 | Stop reason: SDUPTHER

## 2018-03-14 RX ORDER — LISDEXAMFETAMINE DIMESYLATE 70 MG/1
70 CAPSULE ORAL DAILY
Qty: 30 CAPSULE | Refills: 0 | Status: SHIPPED | OUTPATIENT
Start: 2018-03-14 | End: 2019-02-27

## 2018-03-14 RX ORDER — GABAPENTIN 300 MG/1
300 CAPSULE ORAL NIGHTLY
Qty: 90 CAPSULE | Refills: 3 | Status: SHIPPED | OUTPATIENT
Start: 2018-03-14 | End: 2018-10-16

## 2018-03-14 RX ORDER — ALBUTEROL SULFATE 90 UG/1
2 AEROSOL, METERED RESPIRATORY (INHALATION) EVERY 6 HOURS PRN
Qty: 18 G | Refills: 3 | Status: SHIPPED | OUTPATIENT
Start: 2018-03-14

## 2018-03-14 NOTE — PROGRESS NOTES
Subjective:      Patient ID: Neeru Gomez is a 37 y.o. female.    Chief Complaint: Follow-up medications, weight change, asthma    Disclaimer:  This note is prepared using voice recognition software and as such is likely to have errors and has not been proof read. Please contact me for questions.     Patient's coming in today for multiple issues.  Since I last saw her she's doing a lot better.  I did change her from high-dose Pristiq back to 50 mg an hour on Lexapro.  Her stress levels have improved.  She actually forgot her Vyvanse as morning but does well when she's on her Vyvanse also.  She denies any depression symptoms at this time.  She seems to be handling things much better now.    She is been stable and controlled with Vyvanse is 70 mg.  She forgot to take it this morning because she's out of her routine.  Her insurance required us to do once can do a prior authorization each year.    She's been seeing Dr. James Mandel for her left wrist after surgery in July.  She ended up with a nerve ablation on January 15, 2018.  She's currently taking ibuprofen anywhere from 600 800 mg.    From asthma standpointt she's currently on the Advair and Singulair.  She reports occasionally some ALLERGY symptoms off and on.  Doing well otherwise though.  No new flareups.    She does report some weight gain recently.  She believes it may be more related to stress.  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        Lab Results   Component Value Date    WBC 6.41 08/01/2017    HGB 13.8 08/01/2017    HCT 41.4 08/01/2017     08/01/2017    CHOL 214 (H) 08/01/2017    TRIG 156 (H) 08/01/2017    HDL 45 08/01/2017    ALT 11 08/01/2017    AST 15 08/01/2017     08/01/2017    K 5.0 08/01/2017     08/01/2017    CREATININE 0.7 08/01/2017    BUN 11 08/01/2017    CO2 27 08/01/2017    TSH 2.382 08/01/2017    HGBA1C 5.1 08/01/2017       Review of Systems  "  Constitutional: Positive for unexpected weight change. Negative for activity change, appetite change and fatigue.   Respiratory: Negative for cough and shortness of breath.    Cardiovascular: Negative for chest pain and palpitations.   Gastrointestinal: Negative for abdominal distention and abdominal pain.   Musculoskeletal: Positive for arthralgias.   Psychiatric/Behavioral: Positive for decreased concentration. Negative for dysphoric mood. The patient is not nervous/anxious.      Objective:     Vitals:    03/14/18 0740   BP: 124/82   Temp: 98.4 °F (36.9 °C)   TempSrc: Tympanic   Weight: 109.3 kg (240 lb 15.4 oz)   Height: 5' 4" (1.626 m)     Physical Exam   Constitutional: She appears well-developed and well-nourished.   HENT:   Head: Normocephalic and atraumatic.   Right Ear: Tympanic membrane normal.   Left Ear: Tympanic membrane normal.   Mouth/Throat: Oropharynx is clear and moist.   Eyes: Conjunctivae and EOM are normal.   Neck: Normal range of motion. Neck supple.   Cardiovascular: Normal rate and regular rhythm.    Pulmonary/Chest: Effort normal and breath sounds normal.   Psychiatric: She has a normal mood and affect. Her behavior is normal.   Nursing note and vitals reviewed.    Assessment:     1. Anxiety state    2. Mild intermittent asthma without complication    3. Attention deficit disorder, unspecified hyperactivity presence    4. Osteoarthritis, unspecified osteoarthritis type, unspecified site    5. Abnormal weight gain    6. Wellness examination      Plan:   Neeru Paul was seen today for follow-up.    Diagnoses and all orders for this visit:    Anxiety state-stable with current medications of Pristiq and Lexapro  -     Hemoglobin A1c; Future  -     Comprehensive metabolic panel; Future  -     CBC auto differential; Future  -     Lipid panel; Future  -     TSH; Future  -     T4, free; Future  -     Insulin, random; Future    Mild intermittent asthma without complication-stable at this time with " albuterol and Advair  -     Hemoglobin A1c; Future  -     Comprehensive metabolic panel; Future  -     CBC auto differential; Future  -     Lipid panel; Future  -     TSH; Future  -     T4, free; Future  -     Insulin, random; Future    Attention deficit disorder, unspecified hyperactivity presence-stable with Vyvanse refilled for the patient today  -     lisdexamfetamine (VYVANSE) 70 MG capsule; Take 1 capsule (70 mg total) by mouth once daily.  -     Hemoglobin A1c; Future  -     Comprehensive metabolic panel; Future  -     CBC auto differential; Future  -     Lipid panel; Future  -     TSH; Future  -     T4, free; Future  -     Insulin, random; Future    Osteoarthritis, unspecified osteoarthritis type, unspecified site-off-and-on currently seeing outside pain specialist Dr. James Mandel  -     Hemoglobin A1c; Future  -     Comprehensive metabolic panel; Future  -     CBC auto differential; Future  -     Lipid panel; Future  -     TSH; Future  -     T4, free; Future  -     Insulin, random; Future    Abnormal weight gain-wanting to look into insulin levels and thyroid conditions this summer  -     Hemoglobin A1c; Future  -     Comprehensive metabolic panel; Future  -     CBC auto differential; Future  -     Lipid panel; Future  -     TSH; Future  -     T4, free; Future  -     Insulin, random; Future    Wellness examination-code for labs for the summer  -     Hemoglobin A1c; Future  -     Comprehensive metabolic panel; Future  -     CBC auto differential; Future  -     Lipid panel; Future  -     TSH; Future  -     T4, free; Future  -     Insulin, random; Future    Other orders  -     gabapentin (NEURONTIN) 300 MG capsule; Take 1 capsule (300 mg total) by mouth nightly.  -     fluticasone-salmeterol 250-50 mcg/dose (ADVAIR DISKUS) 250-50 mcg/dose diskus inhaler; Inhale 1 puff into the lungs 2 (two) times daily.  -     albuterol 90 mcg/actuation inhaler; Inhale 2 puffs into the lungs every 6 (six) hours as needed for  Wheezing. Rescue ok for ventolin or proair            Follow-up in about 6 months (around 9/14/2018) for physical with Dr LANDERS.

## 2018-06-11 DIAGNOSIS — F98.8 ATTENTION DEFICIT DISORDER, UNSPECIFIED HYPERACTIVITY PRESENCE: ICD-10-CM

## 2018-06-12 RX ORDER — LISDEXAMFETAMINE DIMESYLATE 70 MG/1
CAPSULE ORAL
Qty: 30 CAPSULE | Refills: 0 | Status: SHIPPED | OUTPATIENT
Start: 2018-06-12 | End: 2018-07-13 | Stop reason: SDUPTHER

## 2018-07-10 ENCOUNTER — LAB VISIT (OUTPATIENT)
Dept: LAB | Facility: HOSPITAL | Age: 38
End: 2018-07-10
Attending: FAMILY MEDICINE
Payer: COMMERCIAL

## 2018-07-10 DIAGNOSIS — J45.20 MILD INTERMITTENT ASTHMA WITHOUT COMPLICATION: ICD-10-CM

## 2018-07-10 DIAGNOSIS — R63.5 ABNORMAL WEIGHT GAIN: ICD-10-CM

## 2018-07-10 DIAGNOSIS — M19.90 OSTEOARTHRITIS, UNSPECIFIED OSTEOARTHRITIS TYPE, UNSPECIFIED SITE: ICD-10-CM

## 2018-07-10 DIAGNOSIS — F41.1 ANXIETY STATE: ICD-10-CM

## 2018-07-10 DIAGNOSIS — F98.8 ATTENTION DEFICIT DISORDER, UNSPECIFIED HYPERACTIVITY PRESENCE: ICD-10-CM

## 2018-07-10 DIAGNOSIS — Z00.00 WELLNESS EXAMINATION: ICD-10-CM

## 2018-07-10 LAB
ALBUMIN SERPL BCP-MCNC: 4 G/DL
ALP SERPL-CCNC: 48 U/L
ALT SERPL W/O P-5'-P-CCNC: 18 U/L
ANION GAP SERPL CALC-SCNC: 9 MMOL/L
AST SERPL-CCNC: 16 U/L
BASOPHILS # BLD AUTO: 0.05 K/UL
BASOPHILS NFR BLD: 0.7 %
BILIRUB SERPL-MCNC: 0.3 MG/DL
BUN SERPL-MCNC: 13 MG/DL
CALCIUM SERPL-MCNC: 9.4 MG/DL
CHLORIDE SERPL-SCNC: 103 MMOL/L
CHOLEST SERPL-MCNC: 187 MG/DL
CHOLEST/HDLC SERPL: 4.3 {RATIO}
CO2 SERPL-SCNC: 27 MMOL/L
CREAT SERPL-MCNC: 0.7 MG/DL
DIFFERENTIAL METHOD: ABNORMAL
EOSINOPHIL # BLD AUTO: 0 K/UL
EOSINOPHIL NFR BLD: 0 %
ERYTHROCYTE [DISTWIDTH] IN BLOOD BY AUTOMATED COUNT: 12.6 %
EST. GFR  (AFRICAN AMERICAN): >60 ML/MIN/1.73 M^2
EST. GFR  (NON AFRICAN AMERICAN): >60 ML/MIN/1.73 M^2
ESTIMATED AVG GLUCOSE: 100 MG/DL
GLUCOSE SERPL-MCNC: 94 MG/DL
HBA1C MFR BLD HPLC: 5.1 %
HCT VFR BLD AUTO: 44.5 %
HDLC SERPL-MCNC: 44 MG/DL
HDLC SERPL: 23.5 %
HGB BLD-MCNC: 14.3 G/DL
IMM GRANULOCYTES # BLD AUTO: 0.02 K/UL
IMM GRANULOCYTES NFR BLD AUTO: 0.3 %
LDLC SERPL CALC-MCNC: 102.8 MG/DL
LYMPHOCYTES # BLD AUTO: 2.4 K/UL
LYMPHOCYTES NFR BLD: 33.7 %
MCH RBC QN AUTO: 31.4 PG
MCHC RBC AUTO-ENTMCNC: 32.1 G/DL
MCV RBC AUTO: 98 FL
MONOCYTES # BLD AUTO: 0.8 K/UL
MONOCYTES NFR BLD: 11.3 %
NEUTROPHILS # BLD AUTO: 3.8 K/UL
NEUTROPHILS NFR BLD: 54 %
NONHDLC SERPL-MCNC: 143 MG/DL
NRBC BLD-RTO: 0 /100 WBC
PLATELET # BLD AUTO: 329 K/UL
PMV BLD AUTO: 9.7 FL
POTASSIUM SERPL-SCNC: 4.4 MMOL/L
PROT SERPL-MCNC: 6.9 G/DL
RBC # BLD AUTO: 4.56 M/UL
SODIUM SERPL-SCNC: 139 MMOL/L
T4 FREE SERPL-MCNC: 0.75 NG/DL
TRIGL SERPL-MCNC: 201 MG/DL
TSH SERPL DL<=0.005 MIU/L-ACNC: 3.25 UIU/ML
WBC # BLD AUTO: 6.97 K/UL

## 2018-07-10 PROCEDURE — 84439 ASSAY OF FREE THYROXINE: CPT

## 2018-07-10 PROCEDURE — 80061 LIPID PANEL: CPT

## 2018-07-10 PROCEDURE — 83036 HEMOGLOBIN GLYCOSYLATED A1C: CPT

## 2018-07-10 PROCEDURE — 83525 ASSAY OF INSULIN: CPT

## 2018-07-10 PROCEDURE — 84443 ASSAY THYROID STIM HORMONE: CPT

## 2018-07-10 PROCEDURE — 85025 COMPLETE CBC W/AUTO DIFF WBC: CPT

## 2018-07-10 PROCEDURE — 80053 COMPREHEN METABOLIC PANEL: CPT

## 2018-07-11 LAB
INSULIN COLLECTION INTERVAL: NORMAL
INSULIN SERPL-ACNC: 17.7 UU/ML

## 2018-07-13 DIAGNOSIS — F98.8 ATTENTION DEFICIT DISORDER, UNSPECIFIED HYPERACTIVITY PRESENCE: ICD-10-CM

## 2018-07-13 RX ORDER — LISDEXAMFETAMINE DIMESYLATE 70 MG/1
CAPSULE ORAL
Qty: 30 CAPSULE | Refills: 0 | Status: SHIPPED | OUTPATIENT
Start: 2018-07-13 | End: 2018-07-17 | Stop reason: SDUPTHER

## 2018-07-17 ENCOUNTER — OFFICE VISIT (OUTPATIENT)
Dept: INTERNAL MEDICINE | Facility: CLINIC | Age: 38
End: 2018-07-17
Payer: COMMERCIAL

## 2018-07-17 VITALS
HEIGHT: 64 IN | WEIGHT: 236.56 LBS | SYSTOLIC BLOOD PRESSURE: 118 MMHG | BODY MASS INDEX: 40.39 KG/M2 | HEART RATE: 76 BPM | TEMPERATURE: 97 F | DIASTOLIC BLOOD PRESSURE: 80 MMHG

## 2018-07-17 DIAGNOSIS — F41.1 GAD (GENERALIZED ANXIETY DISORDER): ICD-10-CM

## 2018-07-17 DIAGNOSIS — E88.819 INSULIN RESISTANCE: ICD-10-CM

## 2018-07-17 DIAGNOSIS — R79.89 ABNORMAL THYROID BLOOD TEST: ICD-10-CM

## 2018-07-17 DIAGNOSIS — J45.20 MILD INTERMITTENT ASTHMA WITHOUT COMPLICATION: ICD-10-CM

## 2018-07-17 DIAGNOSIS — F39 MOOD DISORDER: ICD-10-CM

## 2018-07-17 DIAGNOSIS — F41.1 ANXIETY STATE: ICD-10-CM

## 2018-07-17 DIAGNOSIS — Z00.00 ROUTINE GENERAL MEDICAL EXAMINATION AT A HEALTH CARE FACILITY: Primary | ICD-10-CM

## 2018-07-17 DIAGNOSIS — F98.8 ATTENTION DEFICIT DISORDER, UNSPECIFIED HYPERACTIVITY PRESENCE: ICD-10-CM

## 2018-07-17 PROCEDURE — 99999 PR PBB SHADOW E&M-EST. PATIENT-LVL III: CPT | Mod: PBBFAC,,, | Performed by: FAMILY MEDICINE

## 2018-07-17 PROCEDURE — 99395 PREV VISIT EST AGE 18-39: CPT | Mod: S$GLB,,, | Performed by: FAMILY MEDICINE

## 2018-07-17 RX ORDER — LEVOTHYROXINE SODIUM 50 UG/1
50 TABLET ORAL DAILY
Qty: 30 TABLET | Refills: 11 | Status: SHIPPED | OUTPATIENT
Start: 2018-07-17 | End: 2019-07-17

## 2018-07-17 RX ORDER — LORAZEPAM 0.5 MG/1
0.5 TABLET ORAL EVERY 12 HOURS PRN
Qty: 30 TABLET | Refills: 0 | Status: SHIPPED | OUTPATIENT
Start: 2018-07-17 | End: 2018-10-16 | Stop reason: SDUPTHER

## 2018-07-17 NOTE — PROGRESS NOTES
Subjective:      Patient ID: Neeru Gomez is a 38 y.o. female.    Chief Complaint: Annual Exam    Disclaimer:  This note is prepared using voice recognition software and as such is likely to have errors and has not been proof read. Please contact me for questions.     Patient's coming in today for multiple issues and prevention exam. .  Since I last saw her she is a bit more stress lately.  She does have her 3 children here.  She is out for the summer.  She does better with the regular school year.  Does need a refill on her Ativan.  Has been doing weight watchers for the last 2 weeks and lost about 5 lb.  Trying to exercise some more.  Will be planning on joining the meetings when she returns back from her vacation.    Labs reviewed showing the thyroid is slightly decreased with low levels of free T4 at 0.74.  For this reason I believe she would benefit from starting thyroid supplementation as she has also had symptoms of depression hair loss and mood swings.  She does have elevated insulin levels but with doing weight watchers this should help her to choose more lower glycemic foods.  We could consider metformin but she would like to hold off and do weight watchers 1st.    She is now more regularly taking the Vyvanse.  The Lexapro and Pristiq combination seems to be working well for her.      She's been seeing Dr. James Mandel for her left wrist after surgery in July.  She ended up with a nerve ablation on January 15, 2018.  She's currently taking ibuprofen anywhere from 600 800 mg.    From asthma standpointt she's currently on the Advair and Singulair.  She reports occasionally some ALLERGY symptoms off and on.  Doing well otherwise though.  No new flareups.          Lab Results   Component Value Date    WBC 6.97 07/10/2018    HGB 14.3 07/10/2018    HCT 44.5 07/10/2018     07/10/2018    CHOL 187 07/10/2018    TRIG 201 (H) 07/10/2018    HDL 44 07/10/2018    ALT 18 07/10/2018    AST 16 07/10/2018    NA  "139 07/10/2018    K 4.4 07/10/2018     07/10/2018    CREATININE 0.7 07/10/2018    BUN 13 07/10/2018    CO2 27 07/10/2018    TSH 3.251 07/10/2018    HGBA1C 5.1 07/10/2018       Review of Systems   Constitutional: Negative for chills, fatigue and fever.   HENT: Negative for ear pain and trouble swallowing.    Eyes: Negative for pain and visual disturbance.   Respiratory: Negative for cough and shortness of breath.    Cardiovascular: Negative for chest pain and leg swelling.   Gastrointestinal: Negative for abdominal pain, blood in stool, nausea and vomiting.   Endocrine: Positive for heat intolerance. Negative for cold intolerance.   Genitourinary: Negative for dysuria and frequency.   Musculoskeletal: Negative for joint swelling, myalgias and neck pain.   Skin: Negative for color change and rash.   Neurological: Negative for dizziness and headaches.   Psychiatric/Behavioral: Positive for dysphoric mood. Negative for behavioral problems, decreased concentration and sleep disturbance. The patient is nervous/anxious.      Objective:     Vitals:    07/17/18 0800   BP: 118/80   BP Location: Right arm   Patient Position: Sitting   BP Method: Large (Manual)   Pulse: 76   Temp: 97.1 °F (36.2 °C)   TempSrc: Tympanic   Weight: 107.3 kg (236 lb 8.9 oz)   Height: 5' 4" (1.626 m)     Physical Exam   Constitutional: She appears well-developed and well-nourished.   HENT:   Head: Normocephalic and atraumatic.   Right Ear: Tympanic membrane normal.   Left Ear: Tympanic membrane normal.   Mouth/Throat: Oropharynx is clear and moist.   Eyes: Conjunctivae and EOM are normal.   Neck: Normal range of motion. Neck supple.   Cardiovascular: Normal rate and regular rhythm.    Pulmonary/Chest: Effort normal and breath sounds normal.   Psychiatric: Her speech is normal and behavior is normal. Thought content normal. Her mood appears anxious. Cognition and memory are normal.   Nursing note and vitals reviewed.    Assessment:     1. " Routine general medical examination at a health care facility    2. Abnormal thyroid blood test    3. MYKE (generalized anxiety disorder)    4. Attention deficit disorder, unspecified hyperactivity presence    5. Mild intermittent asthma without complication    6. Anxiety state    7. Mood disorder    8. Insulin resistance      Plan:   Neeru Paul was seen today for annual exam.    Diagnoses and all orders for this visit:    Routine general medical examination at a health care facility - labs reviewed. Discussed Health Maintenance issues.       Abnormal thyroid blood test- new with low free T4.  Start levothyroxine 50 mcg.  Schedule for thyroid antibodies.  Schedule thyroid ultrasound.  Follow back up in 3 months.  -     US Soft Tissue Head Neck Thyroid; Future  -     Thyroid peroxidase antibody; Future  -     TSH; Future  -     T4, free; Future    MYKE (generalized anxiety disorder)-refilled Ativan with 30 tablets.  Continue with Lexapro Pristiq    Attention deficit disorder, unspecified hyperactivity presence-benefitting from Vyvanse now taking every day which is helping more with mood stability    Mild intermittent asthma without complication-stable with Singulair and inhalers no recent flare-ups        Mood disorder improving with Lexapro Pristiq adding low-dose thyroid medication    Insulin resistance-patient now doing weight watchers weight down 5 lb.  Work on lower glycemic foods    Other orders  -     levothyroxine (SYNTHROID) 50 MCG tablet; Take 1 tablet (50 mcg total) by mouth once daily.  -     LORazepam (ATIVAN) 0.5 MG tablet; Take 1 tablet (0.5 mg total) by mouth every 12 (twelve) hours as needed for Anxiety (panic attack).            Follow-up in about 3 months (around 10/17/2018) for F/u WT, Labs, Meds Dr Gay.

## 2018-07-30 ENCOUNTER — HOSPITAL ENCOUNTER (OUTPATIENT)
Dept: RADIOLOGY | Facility: HOSPITAL | Age: 38
Discharge: HOME | End: 2018-07-30
Attending: FAMILY MEDICINE
Payer: COMMERCIAL

## 2018-07-30 DIAGNOSIS — R79.89 ABNORMAL THYROID BLOOD TEST: ICD-10-CM

## 2018-07-30 PROCEDURE — 76536 US EXAM OF HEAD AND NECK: CPT | Mod: 26,,, | Performed by: RADIOLOGY

## 2018-07-30 PROCEDURE — 76536 US EXAM OF HEAD AND NECK: CPT | Mod: TC,PO

## 2018-08-18 DIAGNOSIS — F98.8 ATTENTION DEFICIT DISORDER, UNSPECIFIED HYPERACTIVITY PRESENCE: ICD-10-CM

## 2018-08-19 RX ORDER — LISDEXAMFETAMINE DIMESYLATE 70 MG/1
CAPSULE ORAL
Qty: 30 CAPSULE | Refills: 0 | Status: SHIPPED | OUTPATIENT
Start: 2018-08-19 | End: 2018-09-13 | Stop reason: SDUPTHER

## 2018-09-01 ENCOUNTER — OFFICE VISIT (OUTPATIENT)
Dept: URGENT CARE | Facility: CLINIC | Age: 38
End: 2018-09-01
Payer: COMMERCIAL

## 2018-09-01 VITALS
OXYGEN SATURATION: 96 % | RESPIRATION RATE: 16 BRPM | BODY MASS INDEX: 39.03 KG/M2 | SYSTOLIC BLOOD PRESSURE: 128 MMHG | DIASTOLIC BLOOD PRESSURE: 70 MMHG | WEIGHT: 228.63 LBS | HEIGHT: 64 IN | TEMPERATURE: 101 F | HEART RATE: 126 BPM

## 2018-09-01 DIAGNOSIS — R05.9 COUGH: ICD-10-CM

## 2018-09-01 DIAGNOSIS — J02.0 STREP PHARYNGITIS: Primary | ICD-10-CM

## 2018-09-01 LAB
CTP QC/QA: YES
S PYO RRNA THROAT QL PROBE: POSITIVE

## 2018-09-01 PROCEDURE — 87880 STREP A ASSAY W/OPTIC: CPT | Mod: QW,S$GLB,, | Performed by: NURSE PRACTITIONER

## 2018-09-01 PROCEDURE — 99214 OFFICE O/P EST MOD 30 MIN: CPT | Mod: 25,S$GLB,, | Performed by: NURSE PRACTITIONER

## 2018-09-01 PROCEDURE — 99999 PR PBB SHADOW E&M-EST. PATIENT-LVL V: CPT | Mod: PBBFAC,,, | Performed by: NURSE PRACTITIONER

## 2018-09-01 PROCEDURE — 96372 THER/PROPH/DIAG INJ SC/IM: CPT | Mod: S$GLB,,, | Performed by: NURSE PRACTITIONER

## 2018-09-01 PROCEDURE — 3008F BODY MASS INDEX DOCD: CPT | Mod: CPTII,S$GLB,, | Performed by: NURSE PRACTITIONER

## 2018-09-01 RX ORDER — BETAMETHASONE SODIUM PHOSPHATE AND BETAMETHASONE ACETATE 3; 3 MG/ML; MG/ML
6 INJECTION, SUSPENSION INTRA-ARTICULAR; INTRALESIONAL; INTRAMUSCULAR; SOFT TISSUE
Status: COMPLETED | OUTPATIENT
Start: 2018-09-01 | End: 2018-09-01

## 2018-09-01 RX ORDER — PROMETHAZINE HYDROCHLORIDE AND DEXTROMETHORPHAN HYDROBROMIDE 6.25; 15 MG/5ML; MG/5ML
5 SYRUP ORAL NIGHTLY PRN
Qty: 118 ML | Refills: 0 | Status: SHIPPED | OUTPATIENT
Start: 2018-09-01 | End: 2018-09-11

## 2018-09-01 RX ORDER — IBUPROFEN 200 MG
400 TABLET ORAL
Status: COMPLETED | OUTPATIENT
Start: 2018-09-01 | End: 2018-09-01

## 2018-09-01 RX ADMIN — Medication 400 MG: at 10:09

## 2018-09-01 RX ADMIN — BETAMETHASONE SODIUM PHOSPHATE AND BETAMETHASONE ACETATE 6 MG: 3; 3 INJECTION, SUSPENSION INTRA-ARTICULAR; INTRALESIONAL; INTRAMUSCULAR; SOFT TISSUE at 10:09

## 2018-09-01 NOTE — PROGRESS NOTES
"Subjective:      Patient ID: Neeru Gomez is a 38 y.o. female.    Chief Complaint: Sore Throat; Headache; and Fever    Sore Throat    This is a new problem. The current episode started in the past 7 days (2 days). The problem has been gradually worsening. Neither side of throat is experiencing more pain than the other. The maximum temperature recorded prior to her arrival was 100.4 - 100.9 F. The fever has been present for 1 to 2 days. Associated symptoms include congestion, coughing, ear pain, headaches and swollen glands. She has had exposure to strep. She has tried NSAIDs for the symptoms. The treatment provided no relief.     Review of Systems   Constitutional: Positive for chills, fatigue and fever.   HENT: Positive for congestion, ear pain and sore throat.    Respiratory: Positive for cough. Negative for wheezing.    Cardiovascular: Negative.    Gastrointestinal: Negative.    Musculoskeletal: Negative.    Skin: Negative.    Neurological: Positive for headaches.   Hematological: Negative.        Objective:   /70 (BP Location: Right arm, Patient Position: Sitting, BP Method: Large (Manual))   Pulse (!) 126   Temp (!) 100.5 °F (38.1 °C) (Tympanic)   Resp 16   Ht 5' 4" (1.626 m)   Wt 103.7 kg (228 lb 9.9 oz)   SpO2 96%   BMI 39.24 kg/m²   Physical Exam   Constitutional: She is oriented to person, place, and time. She appears well-developed and well-nourished. No distress.   HENT:   Head: Normocephalic and atraumatic.   Right Ear: A middle ear effusion is present.   Left Ear: A middle ear effusion is present.   Nose: Nose normal.   Mouth/Throat: Uvula is midline. No trismus in the jaw. No uvula swelling. Posterior oropharyngeal erythema present. No oropharyngeal exudate or posterior oropharyngeal edema.   Eyes: Conjunctivae are normal.   Neck: Normal range of motion. Neck supple.   Cardiovascular: Normal rate, regular rhythm and normal heart sounds.   Pulmonary/Chest: Effort normal and breath " sounds normal. No respiratory distress.   Lymphadenopathy:     She has cervical adenopathy.   Neurological: She is alert and oriented to person, place, and time.   Skin: Skin is warm and dry. She is not diaphoretic.   Nursing note and vitals reviewed.    Assessment:      1. Strep pharyngitis    2. Cough       Plan:   Strep pharyngitis  -     penicillin G benzathine (BICILLIN LA) injection 1.2 Million Units; Inject 2 mLs (1.2 Million Units total) into the muscle one time.  -     betamethasone acetate-betamethasone sodium phosphate injection 6 mg; Inject 1 mL (6 mg total) into the muscle one time.  -     ibuprofen tablet 400 mg; Take 2 tablets (400 mg total) by mouth one time.  -     POCT Rapid Strep A    Cough  -     promethazine-dextromethorphan (PROMETHAZINE-DM) 6.25-15 mg/5 mL Syrp; Take 5 mLs by mouth nightly as needed.  Dispense: 118 mL; Refill: 0    Instructions, follow up, and supportive care as per AVS.  Follow up with PCP if not improved or for any new or worsening symptoms.

## 2018-09-01 NOTE — PATIENT INSTRUCTIONS
· Take antibiotics exactly as prescribed. Do not stop taking antibiotics sooner than instructed in order to prevent recurrence of infection and antibiotic resistance.   · Once your fever has resolved and you have been taking antibiotics for at least 24 hours, you are no longer considered contagious and may return to work or school.   · You may take Tylenol or Ibuprofen as needed for fever, throat pain, or body aches.   · For sore throat, gargling with warm salt water, throat lozenges, or chloraseptic spray may help with pain.  · Make sure to get a new toothbrush after you have been on antibiotics for 24-48 hours. Please contact your primary care provider if symptoms do not improve within 2 days or sooner for any new or worsening symptoms.  · Please go to the ER for any worsening in your condition including: hives, rash, increased pain or swelling to throat, persistent fever that does not improve with Tylenol/Motrin use, dark urine, severe headache, vision changes, neck stiffness, lethargy, or for any other new or concerning symptoms.    Pharyngitis: Strep (Confirmed)    You have had a positive test for strep throat. Strep throat is a contagious illness. It is spread by coughing, kissing or by touching others after touching your mouth or nose. Symptoms include throat pain that is worse with swallowing, aching all over, headache, and fever. It is treated with antibiotic medicine. This should help you start to feel better in 1 to 2 days.  Home care  · Rest at home. Drink plenty of fluids to you won't get dehydrated.  · No work or school for the first 2 days of taking the antibiotics. After this time, you will not be contagious. You can then return to school or work if you are feeling better.   · Take antibiotic medicine for the full 10 days, even if you feel better. This is very important to ensure the infection is treated. It is also important to prevent medicine-resistant germs from developing. If you were given an  antibiotic shot, you don't need any more antibiotics.  · You may use acetaminophen or ibuprofen to control pain or fever, unless another medicine was prescribed for this. Talk with your doctor before taking these medicines if you have chronic liver or kidney disease. Also talk with your doctor if you have had a stomach ulcer or GI bleeding.  · Throat lozenges or sprays help reduce pain. Gargling with warm saltwater will also reduce throat pain. Dissolve 1/2 teaspoon of salt in 1 glass of warm water. This may be useful just before meals.   · Soft foods are OK. Avoid salty or spicy foods.  Follow-up care  Follow up with your healthcare provider or our staff if you don't get better over the next week.  When to seek medical advice  Call your healthcare provider right away if any of these occur:  · Fever of 100.4ºF (38ºC) or higher, or as directed by your healthcare provider  · New or worsening ear pain, sinus pain, or headache  · Painful lumps in the back of neck  · Stiff neck  · Lymph nodes getting larger or becoming soft in the middle  · You can't swallow liquids or you can't open your mouth wide because of throat pain  · Signs of dehydration. These include very dark urine or no urine, sunken eyes, and dizziness.  · Trouble breathing or noisy breathing  · Muffled voice  · Rash  Date Last Reviewed: 4/13/2015  © 8931-6899 [employer]. [address] [place] [address], [place] [address]. All rights reserved. This information is not intended as a substitute for professional medical care. Always follow your healthcare professional's instructions.

## 2018-09-13 DIAGNOSIS — F98.8 ATTENTION DEFICIT DISORDER, UNSPECIFIED HYPERACTIVITY PRESENCE: ICD-10-CM

## 2018-09-14 RX ORDER — LISDEXAMFETAMINE DIMESYLATE 70 MG/1
CAPSULE ORAL
Qty: 30 CAPSULE | Refills: 0 | Status: SHIPPED | OUTPATIENT
Start: 2018-09-14 | End: 2018-10-16

## 2018-10-10 ENCOUNTER — LAB VISIT (OUTPATIENT)
Dept: LAB | Facility: HOSPITAL | Age: 38
End: 2018-10-10
Attending: FAMILY MEDICINE
Payer: COMMERCIAL

## 2018-10-10 DIAGNOSIS — R79.89 ABNORMAL THYROID BLOOD TEST: ICD-10-CM

## 2018-10-10 PROCEDURE — 84443 ASSAY THYROID STIM HORMONE: CPT

## 2018-10-10 PROCEDURE — 86376 MICROSOMAL ANTIBODY EACH: CPT

## 2018-10-10 PROCEDURE — 84439 ASSAY OF FREE THYROXINE: CPT

## 2018-10-10 PROCEDURE — 36415 COLL VENOUS BLD VENIPUNCTURE: CPT | Mod: PO

## 2018-10-11 LAB
T4 FREE SERPL-MCNC: 0.96 NG/DL
THYROPEROXIDASE IGG SERPL-ACNC: <6 IU/ML
TSH SERPL DL<=0.005 MIU/L-ACNC: 1.97 UIU/ML

## 2018-10-16 ENCOUNTER — OFFICE VISIT (OUTPATIENT)
Dept: INTERNAL MEDICINE | Facility: CLINIC | Age: 38
End: 2018-10-16
Payer: COMMERCIAL

## 2018-10-16 VITALS
DIASTOLIC BLOOD PRESSURE: 68 MMHG | HEIGHT: 64 IN | TEMPERATURE: 97 F | SYSTOLIC BLOOD PRESSURE: 124 MMHG | WEIGHT: 235.25 LBS | RESPIRATION RATE: 16 BRPM | BODY MASS INDEX: 40.16 KG/M2 | HEART RATE: 62 BPM

## 2018-10-16 DIAGNOSIS — E66.01 OBESITY, CLASS III, BMI 40-49.9 (MORBID OBESITY): ICD-10-CM

## 2018-10-16 DIAGNOSIS — E03.9 ACQUIRED HYPOTHYROIDISM: ICD-10-CM

## 2018-10-16 DIAGNOSIS — F98.8 ATTENTION DEFICIT DISORDER, UNSPECIFIED HYPERACTIVITY PRESENCE: ICD-10-CM

## 2018-10-16 DIAGNOSIS — R05.9 COUGH: ICD-10-CM

## 2018-10-16 DIAGNOSIS — J45.20 MILD INTERMITTENT ASTHMA WITHOUT COMPLICATION: ICD-10-CM

## 2018-10-16 DIAGNOSIS — F41.1 ANXIETY STATE: ICD-10-CM

## 2018-10-16 DIAGNOSIS — E88.819 INSULIN RESISTANCE: Primary | ICD-10-CM

## 2018-10-16 DIAGNOSIS — F39 MOOD DISORDER: ICD-10-CM

## 2018-10-16 PROCEDURE — 3008F BODY MASS INDEX DOCD: CPT | Mod: CPTII,S$GLB,, | Performed by: NURSE PRACTITIONER

## 2018-10-16 PROCEDURE — 99999 PR PBB SHADOW E&M-EST. PATIENT-LVL III: CPT | Mod: PBBFAC,,, | Performed by: NURSE PRACTITIONER

## 2018-10-16 PROCEDURE — 99214 OFFICE O/P EST MOD 30 MIN: CPT | Mod: S$GLB,,, | Performed by: NURSE PRACTITIONER

## 2018-10-16 RX ORDER — METFORMIN HYDROCHLORIDE 500 MG/1
1000 TABLET, EXTENDED RELEASE ORAL
Qty: 60 TABLET | Refills: 6 | Status: SHIPPED | OUTPATIENT
Start: 2018-10-16 | End: 2019-04-24 | Stop reason: SDUPTHER

## 2018-10-16 RX ORDER — PROMETHAZINE HYDROCHLORIDE AND DEXTROMETHORPHAN HYDROBROMIDE 6.25; 15 MG/5ML; MG/5ML
5 SYRUP ORAL NIGHTLY PRN
Qty: 240 ML | Refills: 1 | Status: SHIPPED | OUTPATIENT
Start: 2018-10-16 | End: 2018-11-24 | Stop reason: SDUPTHER

## 2018-10-16 RX ORDER — LORAZEPAM 0.5 MG/1
0.5 TABLET ORAL EVERY 12 HOURS PRN
Qty: 30 TABLET | Refills: 0 | Status: SHIPPED | OUTPATIENT
Start: 2018-10-16

## 2018-10-16 NOTE — PROGRESS NOTES
"Subjective:       Patient ID: Neeru Gomez is a 38 y.o. female.    Chief Complaint: Follow-up (lab results)    Patient comes in today for follow up thyroid. She was started on levothyroxine 50mg in July due to low t4. She is doing well with no side effects. Patient denies palpitations, diarrhea, constipation, jitteriness or heat or cold intolerance. She is doing well.    From an asthma stand point she is doing well with advair and singulair, has not had to use rescue.     From a mood standpoint, she is stable on lexapro and pristiq. She uses ativan prn and requests a refill today. She has needed ativan more recently because her  is moving to [facility] and Patient is staying here with the kids until school is out in May.    She is requesting a refill of her cough med to have on hand for prn use.     Last visit, it was found that she was insulin resistant. She did not want to start medications at that time and was going to try weight watchers. A lot went on with her 's job so she never started [employer]. She is asking for information on the pathophysiology and treatment of insulin resistance and is open to starting metformin today to put her in a fat losing states that of a fat storing state due to high and the level.  She is willing to start working on her diet and exercise as well.    ADD is stable on Vyvanse.         /68 (BP Location: Right arm, Patient Position: Sitting, BP Method: Medium (Automatic))   Pulse 62   Temp 97.4 °F (36.3 °C) (Tympanic)   Resp 16   Ht 5' 4" (1.626 m)   Wt 106.7 kg (235 lb 3.7 oz)   BMI 40.38 kg/m²     Review of Systems   Constitutional: Negative for activity change, appetite change, chills, diaphoresis, fatigue, fever and unexpected weight change.   HENT: Negative.    Eyes: Negative for visual disturbance.   Respiratory: Negative for apnea, cough, choking, chest tightness, shortness of breath and wheezing.    Cardiovascular: Negative for chest pain, " palpitations and leg swelling.   Gastrointestinal: Negative for abdominal distention, abdominal pain, anal bleeding, blood in stool, constipation, diarrhea, nausea, rectal pain and vomiting.   Genitourinary: Negative for decreased urine volume, difficulty urinating, dysuria, frequency, hematuria and urgency.   Musculoskeletal: Negative for arthralgias, gait problem and joint swelling.   Skin: Negative for color change, pallor, rash and wound.   Neurological: Negative.  Negative for dizziness, syncope, speech difficulty, light-headedness and headaches.   Psychiatric/Behavioral: Positive for dysphoric mood. Negative for agitation, confusion, hallucinations and sleep disturbance. The patient is nervous/anxious.        Objective:      Physical Exam   Constitutional: She is oriented to person, place, and time. She appears well-developed and well-nourished. She is cooperative. No distress.   HENT:   Head: Normocephalic and atraumatic.   Eyes: Conjunctivae are normal. Right eye exhibits no discharge. Left eye exhibits no discharge.   Cardiovascular: Normal rate, regular rhythm and normal heart sounds.   No murmur heard.  Pulmonary/Chest: Effort normal and breath sounds normal. No respiratory distress. She has no wheezes. She has no rales. She exhibits no tenderness.   Abdominal: Soft. She exhibits no distension and no mass. There is no tenderness. There is no guarding.   Musculoskeletal: Normal range of motion.   Neurological: She is alert and oriented to person, place, and time. She displays normal reflexes. No cranial nerve deficit or sensory deficit. She exhibits normal muscle tone. Coordination normal.   Skin: Skin is warm and dry. No rash noted. She is not diaphoretic.   Psychiatric: Her behavior is normal. Judgment and thought content normal. Her mood appears anxious.   Nursing note and vitals reviewed.      Assessment:       1. Insulin resistance    2. Attention deficit disorder, unspecified hyperactivity presence     3. Anxiety state    4. Mood disorder    5. Mild intermittent asthma without complication    6. Acquired hypothyroidism    7. Cough    8. Obesity, Class III, BMI 40-49.9 (morbid obesity)        Plan:       Neeru Paul was seen today for follow-up.    Diagnoses and all orders for this visit:    Insulin resistance  Comments:  never did ww, discussed metformin, willing to start metformin, 1 pill daily x 2 weeks then bid, will exercise 30 min a day, increase veggies, limit carbs/sugar  Orders:  -     metFORMIN (GLUCOPHAGE-XR) 500 MG 24 hr tablet; Take 2 tablets (1,000 mg total) by mouth daily with breakfast.  -     CBC auto differential; Future  -     Comprehensive metabolic panel; Future  -     Hemoglobin A1c; Future  -     Lipid panel; Future  -     INSULIN, RANDOM; Future    Attention deficit disorder, unspecified hyperactivity presence  Comments:  controlled on Vyvanse     Anxiety state  Comments:  stable on lexapro and pristiq, ativan prn, requests ativan refill today  Orders:  -     LORazepam (ATIVAN) 0.5 MG tablet; Take 1 tablet (0.5 mg total) by mouth every 12 (twelve) hours as needed for Anxiety (panic attack).  -     Comprehensive metabolic panel; Future    Mood disorder  Comments:  stable on lexapro and pristiq, using ativan prn    Mild intermittent asthma without complication  Comments:  controlled on advair and singulair    Acquired hypothyroidism  Comments:  t4 and tsh improved. continue synthroid 50  Orders:  -     CBC auto differential; Future  -     Comprehensive metabolic panel; Future  -     TSH; Future  -     T4, free; Future    Cough  -     promethazine-dextromethorphan (PROMETHAZINE-DM) 6.25-15 mg/5 mL Syrp; Take 5 mLs by mouth nightly as needed (Cough).    Obesity, Class III, BMI 40-49.9 (morbid obesity)  -     Lipid panel; Future    follow up with Dr. Gay in 6 months to repeat labs and for follow up thyroid, metformin, anxiety, weight, add

## 2018-10-22 RX ORDER — ESCITALOPRAM OXALATE 10 MG/1
TABLET ORAL
Qty: 90 TABLET | Refills: 3 | Status: SHIPPED | OUTPATIENT
Start: 2018-10-22 | End: 2019-11-11 | Stop reason: SDUPTHER

## 2018-10-22 RX ORDER — DESVENLAFAXINE SUCCINATE 50 MG/1
TABLET, EXTENDED RELEASE ORAL
Qty: 30 TABLET | Refills: 11 | Status: SHIPPED | OUTPATIENT
Start: 2018-10-22

## 2018-11-08 DIAGNOSIS — F98.8 ATTENTION DEFICIT DISORDER, UNSPECIFIED HYPERACTIVITY PRESENCE: ICD-10-CM

## 2018-11-12 RX ORDER — LISDEXAMFETAMINE DIMESYLATE 70 MG/1
CAPSULE ORAL
Qty: 30 CAPSULE | Refills: 0 | Status: SHIPPED | OUTPATIENT
Start: 2018-11-12 | End: 2018-11-17

## 2018-11-17 ENCOUNTER — OFFICE VISIT (OUTPATIENT)
Dept: URGENT CARE | Facility: CLINIC | Age: 38
End: 2018-11-17
Payer: COMMERCIAL

## 2018-11-17 VITALS
HEIGHT: 64 IN | WEIGHT: 233.69 LBS | DIASTOLIC BLOOD PRESSURE: 66 MMHG | TEMPERATURE: 97 F | HEART RATE: 91 BPM | BODY MASS INDEX: 39.9 KG/M2 | SYSTOLIC BLOOD PRESSURE: 141 MMHG

## 2018-11-17 DIAGNOSIS — J45.21 MILD INTERMITTENT ASTHMA WITH (ACUTE) EXACERBATION: ICD-10-CM

## 2018-11-17 DIAGNOSIS — J32.0 RIGHT MAXILLARY SINUSITIS: Primary | ICD-10-CM

## 2018-11-17 PROCEDURE — 3008F BODY MASS INDEX DOCD: CPT | Mod: CPTII,S$GLB,, | Performed by: NURSE PRACTITIONER

## 2018-11-17 PROCEDURE — 99999 PR PBB SHADOW E&M-EST. PATIENT-LVL III: CPT | Mod: PBBFAC,,, | Performed by: NURSE PRACTITIONER

## 2018-11-17 PROCEDURE — 99214 OFFICE O/P EST MOD 30 MIN: CPT | Mod: S$GLB,,, | Performed by: NURSE PRACTITIONER

## 2018-11-17 RX ORDER — PREDNISONE 20 MG/1
20 TABLET ORAL DAILY
Qty: 5 TABLET | Refills: 0 | Status: SHIPPED | OUTPATIENT
Start: 2018-11-17 | End: 2018-11-22

## 2018-11-17 RX ORDER — AMOXICILLIN AND CLAVULANATE POTASSIUM 875; 125 MG/1; MG/1
1 TABLET, FILM COATED ORAL EVERY 12 HOURS
Qty: 20 TABLET | Refills: 0 | Status: SHIPPED | OUTPATIENT
Start: 2018-11-17 | End: 2018-11-27 | Stop reason: ALTCHOICE

## 2018-11-17 NOTE — PROGRESS NOTES
Subjective:       Patient ID: Neeru Gomez is a 38 y.o. female.    Chief Complaint: Sinus Problem and Asthma    Sinus Problem   This is a new problem. Episode onset: 10 days ago. The problem has been rapidly worsening since onset. There has been no fever. Her pain is at a severity of 5/10 (right sinus, with throbbing right maxillary sinus with bending over). The pain is moderate. Associated symptoms include coughing (occasional non productive), ear pain (right), headaches and sinus pressure. Pertinent negatives include no chills, shortness of breath, sneezing or sore throat. Treatments tried: tylenol sinus. The treatment provided no relief.   Asthma   She complains of chest tightness, cough (occasional non productive) and wheezing. There is no shortness of breath. This is a new problem. The current episode started yesterday. Asthma causes daytime symptoms weekly. Asthma causes nighttime symptoms never. The problem has been unchanged. The cough is non-productive. Associated symptoms include ear pain (right), headaches, postnasal drip and rhinorrhea (post nasal discharge brownish yellow). Pertinent negatives include no appetite change, sneezing or sore throat. Her symptoms are aggravated by change in weather (sinus infection). Her symptoms are alleviated by beta-agonist (advair). She reports significant improvement on treatment. Her past medical history is significant for asthma.     Review of Systems   Constitutional: Negative for activity change, appetite change and chills.   HENT: Positive for ear pain (right), postnasal drip, rhinorrhea (post nasal discharge brownish yellow), sinus pressure and sinus pain (right maxillary sinus). Negative for sneezing and sore throat.    Eyes: Negative.    Respiratory: Positive for cough (occasional non productive) and wheezing. Negative for shortness of breath.    Gastrointestinal: Negative.    Musculoskeletal: Negative.    Skin: Negative.    Allergic/Immunologic: Negative  for environmental allergies and food allergies.   Neurological: Positive for headaches.       Objective:      Physical Exam   Constitutional: She is oriented to person, place, and time. She appears well-developed and well-nourished. She is active and cooperative.  Non-toxic appearance. No distress.   HENT:   Head: Normocephalic.   Right Ear: Tympanic membrane, external ear and ear canal normal.   Left Ear: Tympanic membrane, external ear and ear canal normal.   Nose: Mucosal edema present. No rhinorrhea. Right sinus exhibits maxillary sinus tenderness. Right sinus exhibits no frontal sinus tenderness. Left sinus exhibits no maxillary sinus tenderness and no frontal sinus tenderness.   Mouth/Throat: Uvula is midline, oropharynx is clear and moist and mucous membranes are normal. No oropharyngeal exudate, posterior oropharyngeal edema or posterior oropharyngeal erythema.   Eyes: Conjunctivae are normal.   Neck: Neck supple.   Cardiovascular: Normal rate, regular rhythm, normal heart sounds and intact distal pulses.   Pulmonary/Chest: Effort normal and breath sounds normal.   Abdominal: Soft. Bowel sounds are normal.   Musculoskeletal: Normal range of motion.   Neurological: She is alert and oriented to person, place, and time.   Skin: Skin is warm and dry.   Psychiatric: She has a normal mood and affect. Her behavior is normal. Judgment and thought content normal.   Vitals reviewed.      Assessment:       1. Right maxillary sinusitis    2. Mild intermittent asthma with (acute) exacerbation        Plan:       Neeru Paul was seen today for sinus problem and asthma.    Diagnoses and all orders for this visit:    Right maxillary sinusitis  -     amoxicillin-clavulanate 875-125mg (AUGMENTIN) 875-125 mg per tablet; Take 1 tablet by mouth every 12 (twelve) hours.  -     sodium chloride (OCEAN NASAL) 0.65 % nasal spray; 1 spray by Nasal route as needed for Congestion.    Mild intermittent asthma with (acute) exacerbation  -      predniSONE (DELTASONE) 20 MG tablet; Take 1 tablet (20 mg total) by mouth once daily. for 5 days    begin albuterol nebs, continue advair, albuterol inhaler.   Follow up with Primary Care Provider if not improving over next 3-5 days.  Complete antibiotic for right sinus infection  Nasal spray or rinse twice a day until improved.    Follow-up if symptoms worsen or fail to improve.

## 2018-11-17 NOTE — PATIENT INSTRUCTIONS
Self-Care for Sinusitis     Drinking plenty of water can help sinuses drain.   Sinusitis can often be managed with self-care. Self-care can keep sinuses moist and make you feel more comfortable. Remember to follow your doctor's instructions closely. This can make a big difference in getting your sinus problem under control.  Drink fluids  Drinking extra fluids helps thin your mucus. This lets it drain from your sinuses more easily. Have a glass of water every hour or two. A humidifier helps in much the same way. Fluids can also offset the drying effects of certain medicines. If you use a humidifier, follow the product maker's instructions on how to use it. Clean it on a regular schedule.  Use saltwater rinses  Rinses help keep your sinuses and nose moist. Mix a teaspoon of salt in 8 ounces of fresh, warm water. Use a bulb syringe to gently squirt the water into your nose a few times a day. You can also buy ready-made saline nasal sprays.  Apply hot or cold packs  Applying heat to the area surrounding your sinuses may make you feel more comfortable. Use a hot water bottle or a hand towel dipped in hot water. Some people also find ice packs effective for relieving pain.  Medicines  Your doctor may prescribe medications to help treat your sinusitis. If you have an infection, antibiotics can help clear it up. If you are prescribed antibiotics, take all pills on schedule until they are gone, even if you feel better. Decongestants help relieve swelling. Use decongestant sprays for short periods only under the direction of your doctor. If you have allergies, your doctor may prescribe medications to help relieve them.   Date Last Reviewed: 10/1/2016  © 4223-4881 [employer]. [address] [place] [address]. All rights reserved. This information is not intended as a substitute for professional medical care. Always follow your healthcare professional's instructions.        Sinusitis (Antibiotic  Treatment)    The sinuses are air-filled spaces within the bones of the face. They connect to the inside of the nose. Sinusitis is an inflammation of the tissue lining the sinus cavity. Sinus inflammation can occur during a cold. It can also be due to allergies to pollens and other particles in the air. Sinusitis can cause symptoms of sinus congestion and fullness. A sinus infection causes fever, headache and facial pain. There is often green or yellow drainage from the nose or into the back of the throat (post-nasal drip). You have been given antibiotics to treat this condition.  Home care:  · Take the full course of antibiotics as instructed. Do not stop taking them, even if you feel better.  · Drink plenty of water, hot tea, and other liquids. This may help thin mucus. It also may promote sinus drainage.  · Heat may help soothe painful areas of the face. Use a towel soaked in hot water. Or,  the shower and direct the hot spray onto your face. Using a vaporizer along with a menthol rub at night may also help.   · An expectorant containing guaifenesin may help thin the mucus and promote drainage from the sinuses.  · Over-the-counter decongestants may be used unless a similar medicine was prescribed. Nasal sprays work the fastest. Use one that contains phenylephrine or oxymetazoline. First blow the nose gently. Then use the spray. Do not use these medicines more often than directed on the label or symptoms may get worse. You may also use tablets containing pseudoephedrine. Avoid products that combine ingredients, because side effects may be increased. Read labels. You can also ask the pharmacist for help. (NOTE: Persons with high blood pressure should not use decongestants. They can raise blood pressure.)  · Over-the-counter antihistamines may help if allergies contributed to your sinusitis.    · Do not use nasal rinses or irrigation during an acute sinus infection, unless told to by your health care  provider. Rinsing may spread the infection to other sinuses.  · Use acetaminophen or ibuprofen to control pain, unless another pain medicine was prescribed. (If you have chronic liver or kidney disease or ever had a stomach ulcer, talk with your doctor before using these medicines. Aspirin should never be used in anyone under 18 years of age who is ill with a fever. It may cause severe liver damage.)  · Don't smoke. This can worsen symptoms.  Follow-up care  Follow up with your healthcare provider or our staff if you are not improving within the next week.  When to seek medical advice  Call your healthcare provider if any of these occur:  · Facial pain or headache becoming more severe  · Stiff neck  · Unusual drowsiness or confusion  · Swelling of the forehead or eyelids  · Vision problems, including blurred or double vision  · Fever of 100.4ºF (38ºC) or higher, or as directed by your healthcare provider  · Seizure  · Breathing problems  · Symptoms not resolving within 10 days  Date Last Reviewed: 4/13/2015  © 5122-2915 [employer]. [address] [place] [address], [place] [address]. All rights reserved. This information is not intended as a substitute for professional medical care. Always follow your healthcare professional's instructions.

## 2018-11-24 ENCOUNTER — OFFICE VISIT (OUTPATIENT)
Dept: URGENT CARE | Facility: CLINIC | Age: 38
End: 2018-11-24
Payer: COMMERCIAL

## 2018-11-24 VITALS
TEMPERATURE: 99 F | OXYGEN SATURATION: 98 % | HEIGHT: 64 IN | WEIGHT: 221.81 LBS | BODY MASS INDEX: 37.87 KG/M2 | RESPIRATION RATE: 17 BRPM | HEART RATE: 95 BPM

## 2018-11-24 DIAGNOSIS — J32.9 SINUSITIS, UNSPECIFIED CHRONICITY, UNSPECIFIED LOCATION: Primary | ICD-10-CM

## 2018-11-24 DIAGNOSIS — J40 BRONCHITIS: ICD-10-CM

## 2018-11-24 PROCEDURE — 96372 THER/PROPH/DIAG INJ SC/IM: CPT | Mod: S$GLB,,, | Performed by: NURSE PRACTITIONER

## 2018-11-24 PROCEDURE — 3008F BODY MASS INDEX DOCD: CPT | Mod: CPTII,S$GLB,, | Performed by: NURSE PRACTITIONER

## 2018-11-24 PROCEDURE — 99214 OFFICE O/P EST MOD 30 MIN: CPT | Mod: 25,S$GLB,, | Performed by: NURSE PRACTITIONER

## 2018-11-24 PROCEDURE — 99999 PR PBB SHADOW E&M-EST. PATIENT-LVL IV: CPT | Mod: PBBFAC,,, | Performed by: NURSE PRACTITIONER

## 2018-11-24 RX ORDER — DOXYCYCLINE 100 MG/1
100 CAPSULE ORAL EVERY 12 HOURS
Qty: 20 CAPSULE | Refills: 0 | Status: SHIPPED | OUTPATIENT
Start: 2018-11-24 | End: 2018-12-04

## 2018-11-24 RX ORDER — METHYLPREDNISOLONE ACETATE 40 MG/ML
40 INJECTION, SUSPENSION INTRA-ARTICULAR; INTRALESIONAL; INTRAMUSCULAR; SOFT TISSUE
Status: COMPLETED | OUTPATIENT
Start: 2018-11-24 | End: 2018-11-24

## 2018-11-24 RX ORDER — BETAMETHASONE SODIUM PHOSPHATE AND BETAMETHASONE ACETATE 3; 3 MG/ML; MG/ML
6 INJECTION, SUSPENSION INTRA-ARTICULAR; INTRALESIONAL; INTRAMUSCULAR; SOFT TISSUE
Status: DISCONTINUED | OUTPATIENT
Start: 2018-11-24 | End: 2018-11-24

## 2018-11-24 RX ORDER — PROMETHAZINE HYDROCHLORIDE AND DEXTROMETHORPHAN HYDROBROMIDE 6.25; 15 MG/5ML; MG/5ML
5 SYRUP ORAL NIGHTLY PRN
Qty: 240 ML | Refills: 0 | Status: SHIPPED | OUTPATIENT
Start: 2018-11-24 | End: 2018-11-27 | Stop reason: SDUPTHER

## 2018-11-24 RX ADMIN — METHYLPREDNISOLONE ACETATE 40 MG: 40 INJECTION, SUSPENSION INTRA-ARTICULAR; INTRALESIONAL; INTRAMUSCULAR; SOFT TISSUE at 11:11

## 2018-11-24 NOTE — PROGRESS NOTES
"Subjective:      Patient ID: Neeru Gomez is a 38 y.o. female.    Chief Complaint: Sinusitis    Sinusitis   This is a new problem. The current episode started 1 to 4 weeks ago (3-4 weeks). The problem is unchanged. There has been no fever. Associated symptoms include congestion, coughing and sinus pressure. Pertinent negatives include no ear pain, shortness of breath or sore throat. Headaches: sinus pressure only. Treatments tried: augmentin, prednisone, and ocean nasal spray. The treatment provided no relief.     Review of Systems   Constitutional: Positive for fatigue. Negative for fever.   HENT: Positive for congestion, postnasal drip and sinus pressure. Negative for ear pain and sore throat.    Eyes: Negative.    Respiratory: Positive for cough. Negative for shortness of breath.    Cardiovascular: Negative.    Gastrointestinal: Negative.    Genitourinary: Negative.    Musculoskeletal: Negative.    Skin: Negative.    Neurological: Negative.  Headaches: sinus pressure only.   Hematological: Negative.        Objective:   Pulse 95   Temp 99.2 °F (37.3 °C) (Tympanic)   Resp 17   Ht 5' 4" (1.626 m)   Wt 100.6 kg (221 lb 12.5 oz)   SpO2 98%   BMI 38.07 kg/m²   Physical Exam   Constitutional: She is oriented to person, place, and time. She appears well-developed and well-nourished. No distress.   HENT:   Head: Normocephalic and atraumatic.   Nose: Nose normal.   Mouth/Throat: Oropharynx is clear and moist.   Eyes: Conjunctivae are normal.   Neck: Normal range of motion. Neck supple.   Cardiovascular: Normal rate, regular rhythm and normal heart sounds.   Pulmonary/Chest: Effort normal and breath sounds normal. No respiratory distress.   Lymphadenopathy:     She has no cervical adenopathy.   Neurological: She is alert and oriented to person, place, and time.   Skin: Skin is warm and dry. She is not diaphoretic.   Nursing note and vitals reviewed.    Assessment:      1. Sinusitis, unspecified chronicity, " unspecified location    2. Bronchitis       Plan:   Sinusitis, unspecified chronicity, unspecified location  -     doxycycline (VIBRAMYCIN) 100 MG Cap; Take 1 capsule (100 mg total) by mouth every 12 (twelve) hours. for 10 days  Dispense: 20 capsule; Refill: 0    Bronchitis  -     promethazine-dextromethorphan (PROMETHAZINE-DM) 6.25-15 mg/5 mL Syrp; Take 5 mLs by mouth nightly as needed (Cough).  Dispense: 240 mL; Refill: 0  -     betamethasone acetate-betamethasone sodium phosphate injection 6 mg    Instructions, follow up, and supportive care as per AVS.  Follow up with PCP if not improved or for any new or worsening symptoms.  Mrs. Gomez requests a steroid injection as she has had them in the past for similar symptoms with good relief and no adverse effects. Discussed risks of treatment with steroid. She verbalizes understanding and would like to proceed with injection. Especially reviewed risks related to frequent use of steroid as her last was about 3 months ago.

## 2018-11-24 NOTE — PATIENT INSTRUCTIONS
· Rest and increase fluids.   · May apply warm compresses as needed.   · Saline nasal spray or saline irrigation (Neti pot) to loosen nasal congestion.  · Flonase or Nasacort to reduce inflammation in the sinus cavities.  · Take antibiotics exactly as prescribed. Make sure to complete the entire course of antibiotics even if you start feeling better. This will prevent recurrence of your infection and bacterial resistance.   · Do not drive, drink alcohol, or take any other sedating medications or substances while taking cough syrup.   · Follow up with your primary care provider or with ENT if not improved within a few days or sooner for any new or worsening symptoms.   · Go to the ER for any fever that does not improve with Tylenol/Ibuprofen, neck stiffness, rash, severe headache, vision changes, shortness of breath, chest pain, severe facial pain or swelling, or for any other new and concerning symptoms.     What Is Acute Bronchitis?  Acute bronchitis is when the airways in your lungs (bronchial tubes) become red and swollen (inflamed). It is usually caused by a viral infection. But it can also occur because of a bacteria or allergen. Symptoms include a cough that produces yellow or greenish mucus and can last for days or sometimes weeks.  Inside healthy lungs    Air travels in and out of the lungs through the airways. The linings of these airways produce sticky mucus. This mucus traps particles that enter the lungs. Tiny structures called cilia then sweep the particles out of the airways.     Healthy airway: Airways are normally open. Air moves in and out easily.      Healthy cilia: Tiny, hairlike cilia sweep mucus and particles up and out of the airways.   Lungs with bronchitis  Bronchitis often occurs with a cold or the flu virus. The airways become inflamed (red and swollen). There is a deep hacking cough from the extra mucus. Other symptoms may include:  · Wheezing  · Chest discomfort  · Shortness of  breath  · Mild fever  A second infection, this time due to bacteria, may then occur. And airways irritated by allergens or smoke are more likely to get infected.        Inflamed airway: Inflammation and extra mucus narrow the airway, causing shortness of breath.      Impaired cilia: Extra mucus impairs cilia, causing congestion and wheezing. Smoking makes the problem worse.   Making a diagnosis  A physical exam, health history, and certain tests help your healthcare provider make the diagnosis.  Health history  Your healthcare provider will ask you about your symptoms.  The exam  Your provider listens to your chest for signs of congestion. He or she may also check your ears, nose, and throat.  Possible tests  · A sputum test for bacteria. This requires a sample of mucus from your lungs.  · A nasal or throat swab. This tests to see if you have a bacterial infection.  · A chest X-ray. This is done if your healthcare provider thinks you have pneumonia.  · Tests to check for an underlying condition. Other tests may be done to check for things such as allergies, asthma, or COPD (chronic obstructive pulmonary disease). You may need to see a specialist for more lung function testing.  Treating a cough  The main treatment for bronchitis is easing symptoms. Avoiding smoke, allergens, and other things that trigger coughing can often help. If the infection is bacterial, you may be given antibiotics. During the illness, it's important to get plenty of sleep. To ease symptoms:  · Dont smoke. Also avoid secondhand smoke.  · Use a humidifier. Or try breathing in steam from a hot shower. This may help loosen mucus.  · Drink a lot of water and juice. They can soothe the throat and may help thin mucus.  · Sit up or use extra pillows when in bed. This helps to lessen coughing and congestion.  · Ask your provider about using medicine. Ask about using cough medicine, pain and fever medicine, or a decongestant.  Antibiotics  Most cases  of bronchitis are caused by cold or flu viruses. They dont need antibiotics to treat them, even if your mucus is thick and green or yellow. Antibiotics dont treat viral illness and antibiotics have not been shown to have any benefit in cases of acute bronchitis. Taking antibiotics when they are not needed increases your risk of getting an infection later that is antibiotic-resistant. Antibiotics can also cause severe cases of diarrhea that require other antibiotics to treat.  It is important that you accept your healthcare provider's opinion to not use antibiotics. Your provider will prescribe antibiotics if the infection is caused by bacteria. If they are prescribed:  · Take all of the medicine. Take the medicine until it is used up, even if symptoms have improved. If you dont, the bronchitis may come back.  · Take the medicines as directed. For instance, some medicines should be taken with food.  · Ask about side effects. Ask your provider or pharmacist what side effects are common, and what to do about them.  Follow-up care  You should see your provider again in 2 to 3 weeks. By this time, symptoms should have improved. An infection that lasts longer may mean you have a more serious problem.  Prevention  · Avoid tobacco smoke. If you smoke, quit. Stay away from smoky places. Ask friends and family not to smoke around you, or in your home or car.  · Get checked for allergies.  · Ask your provider about getting a yearly flu shot. Also ask about pneumococcal or pneumonia shots.  · Wash your hands often. This helps reduce the chance of picking up viruses that cause colds and flu.  Call your healthcare provider if:  · Symptoms worsen, or you have new symptoms  · Breathing problems worsen or  become severe  · Symptoms dont get better within a week, or within 3 days of taking antibiotics   Date Last Reviewed: 2/1/2017  © 6833-5144 [employer]. [address] [place] [address], [place] [address]. All rights  reserved. This information is not intended as a substitute for professional medical care. Always follow your healthcare professional's instructions.        Sinusitis (Antibiotic Treatment)    The sinuses are air-filled spaces within the bones of the face. They connect to the inside of the nose. Sinusitis is an inflammation of the tissue lining the sinus cavity. Sinus inflammation can occur during a cold. It can also be due to allergies to pollens and other particles in the air. Sinusitis can cause symptoms of sinus congestion and fullness. A sinus infection causes fever, headache and facial pain. There is often green or yellow drainage from the nose or into the back of the throat (post-nasal drip). You have been given antibiotics to treat this condition.  Home care:  · Take the full course of antibiotics as instructed. Do not stop taking them, even if you feel better.  · Drink plenty of water, hot tea, and other liquids. This may help thin mucus. It also may promote sinus drainage.  · Heat may help soothe painful areas of the face. Use a towel soaked in hot water. Or,  the shower and direct the hot spray onto your face. Using a vaporizer along with a menthol rub at night may also help.   · An expectorant containing guaifenesin may help thin the mucus and promote drainage from the sinuses.  · Over-the-counter decongestants may be used unless a similar medicine was prescribed. Nasal sprays work the fastest. Use one that contains phenylephrine or oxymetazoline. First blow the nose gently. Then use the spray. Do not use these medicines more often than directed on the label or symptoms may get worse. You may also use tablets containing pseudoephedrine. Avoid products that combine ingredients, because side effects may be increased. Read labels. You can also ask the pharmacist for help. (NOTE: Persons with high blood pressure should not use decongestants. They can raise blood  pressure.)  · Over-the-counter antihistamines may help if allergies contributed to your sinusitis.    · Do not use nasal rinses or irrigation during an acute sinus infection, unless told to by your health care provider. Rinsing may spread the infection to other sinuses.  · Use acetaminophen or ibuprofen to control pain, unless another pain medicine was prescribed. (If you have chronic liver or kidney disease or ever had a stomach ulcer, talk with your doctor before using these medicines. Aspirin should never be used in anyone under 18 years of age who is ill with a fever. It may cause severe liver damage.)  · Don't smoke. This can worsen symptoms.  Follow-up care  Follow up with your healthcare provider or our staff if you are not improving within the next week.  When to seek medical advice  Call your healthcare provider if any of these occur:  · Facial pain or headache becoming more severe  · Stiff neck  · Unusual drowsiness or confusion  · Swelling of the forehead or eyelids  · Vision problems, including blurred or double vision  · Fever of 100.4ºF (38ºC) or higher, or as directed by your healthcare provider  · Seizure  · Breathing problems  · Symptoms not resolving within 10 days  Date Last Reviewed: 4/13/2015  © 0931-8152 [employer]. [address] [place] [address], [place] [address]. All rights reserved. This information is not intended as a substitute for professional medical care. Always follow your healthcare professional's instructions.

## 2018-11-26 ENCOUNTER — PATIENT MESSAGE (OUTPATIENT)
Dept: INTERNAL MEDICINE | Facility: CLINIC | Age: 38
End: 2018-11-26

## 2018-11-27 ENCOUNTER — HOSPITAL ENCOUNTER (OUTPATIENT)
Dept: RADIOLOGY | Facility: HOSPITAL | Age: 38
Discharge: HOME | End: 2018-11-27
Attending: NURSE PRACTITIONER
Payer: COMMERCIAL

## 2018-11-27 ENCOUNTER — OFFICE VISIT (OUTPATIENT)
Dept: INTERNAL MEDICINE | Facility: CLINIC | Age: 38
End: 2018-11-27
Payer: COMMERCIAL

## 2018-11-27 VITALS
OXYGEN SATURATION: 98 % | DIASTOLIC BLOOD PRESSURE: 86 MMHG | RESPIRATION RATE: 18 BRPM | SYSTOLIC BLOOD PRESSURE: 110 MMHG | BODY MASS INDEX: 40.42 KG/M2 | HEART RATE: 96 BPM | WEIGHT: 236.75 LBS | HEIGHT: 64 IN | TEMPERATURE: 97 F

## 2018-11-27 DIAGNOSIS — R06.2 WHEEZING: ICD-10-CM

## 2018-11-27 DIAGNOSIS — J45.998 ASTHMA, PERSISTENT CONTROLLED: ICD-10-CM

## 2018-11-27 DIAGNOSIS — J06.9 UPPER RESPIRATORY TRACT INFECTION, UNSPECIFIED TYPE: ICD-10-CM

## 2018-11-27 DIAGNOSIS — R05.9 COUGH: ICD-10-CM

## 2018-11-27 DIAGNOSIS — B96.89 BACTERIAL LOWER RESPIRATORY INFECTION: ICD-10-CM

## 2018-11-27 DIAGNOSIS — J22 BACTERIAL LOWER RESPIRATORY INFECTION: ICD-10-CM

## 2018-11-27 DIAGNOSIS — J45.41 MODERATE PERSISTENT ASTHMATIC BRONCHITIS WITH ACUTE EXACERBATION: Primary | ICD-10-CM

## 2018-11-27 PROCEDURE — 99999 PR PBB SHADOW E&M-EST. PATIENT-LVL V: CPT | Mod: PBBFAC,,, | Performed by: NURSE PRACTITIONER

## 2018-11-27 PROCEDURE — 94640 AIRWAY INHALATION TREATMENT: CPT | Mod: S$GLB,,, | Performed by: NURSE PRACTITIONER

## 2018-11-27 PROCEDURE — 3008F BODY MASS INDEX DOCD: CPT | Mod: CPTII,S$GLB,, | Performed by: NURSE PRACTITIONER

## 2018-11-27 PROCEDURE — 71046 X-RAY EXAM CHEST 2 VIEWS: CPT | Mod: 26,,, | Performed by: RADIOLOGY

## 2018-11-27 PROCEDURE — 99214 OFFICE O/P EST MOD 30 MIN: CPT | Mod: 25,S$GLB,, | Performed by: NURSE PRACTITIONER

## 2018-11-27 PROCEDURE — 71046 X-RAY EXAM CHEST 2 VIEWS: CPT | Mod: TC,FY,PO

## 2018-11-27 RX ORDER — PROMETHAZINE HYDROCHLORIDE AND DEXTROMETHORPHAN HYDROBROMIDE 6.25; 15 MG/5ML; MG/5ML
5 SYRUP ORAL NIGHTLY PRN
Qty: 240 ML | Refills: 0 | Status: SHIPPED | OUTPATIENT
Start: 2018-11-27 | End: 2019-03-19 | Stop reason: SDUPTHER

## 2018-11-27 RX ORDER — PREDNISONE 20 MG/1
TABLET ORAL
Qty: 20 TABLET | Refills: 0 | Status: SHIPPED | OUTPATIENT
Start: 2018-11-27 | End: 2018-12-28

## 2018-11-27 RX ORDER — IPRATROPIUM BROMIDE AND ALBUTEROL SULFATE 2.5; .5 MG/3ML; MG/3ML
3 SOLUTION RESPIRATORY (INHALATION)
Status: COMPLETED | OUTPATIENT
Start: 2018-11-27 | End: 2018-11-27

## 2018-11-27 RX ORDER — FLUTICASONE PROPIONATE AND SALMETEROL 250; 50 UG/1; UG/1
1 POWDER RESPIRATORY (INHALATION) 2 TIMES DAILY
Qty: 60 EACH | Refills: 11 | Status: SHIPPED | OUTPATIENT
Start: 2018-11-27

## 2018-11-27 RX ADMIN — IPRATROPIUM BROMIDE AND ALBUTEROL SULFATE 3 ML: 2.5; .5 SOLUTION RESPIRATORY (INHALATION) at 11:11

## 2018-11-27 NOTE — PROGRESS NOTES
"Subjective:       Patient ID: Neeru Gomez is a 38 y.o. female.    Chief Complaint: URI    Patient is seen for 3rd visit for cough/congestion. She took augmentin and steroid pills, then returned to Urgent Care and was given doxycycline and steroid shot. She reports that she is not improving and worsening. She has subjective fever and cough with blood streaked mucous.      URI    This is a new problem. The problem has been gradually worsening. Maximum temperature: subjective. Associated symptoms include congestion, coughing, ear pain, rhinorrhea, sinus pain, sneezing, a sore throat and wheezing. Pertinent negatives include no abdominal pain, chest pain, diarrhea, dysuria, headaches, joint pain, joint swelling, nausea, neck pain, plugged ear sensation, rash, swollen glands or vomiting. Treatments tried: doxycycline, albuterol treatments, cough rx.       /86 (BP Location: Right arm, Patient Position: Sitting, BP Method: Medium (Automatic))   Pulse 96   Temp 97.2 °F (36.2 °C) (Tympanic)   Resp 18   Ht 5' 4" (1.626 m)   Wt 107.4 kg (236 lb 12.4 oz)   SpO2 98%   BMI 40.64 kg/m²     Review of Systems   Constitutional: Positive for activity change, appetite change and fatigue. Negative for chills, diaphoresis, fever and unexpected weight change.   HENT: Positive for congestion, ear pain, postnasal drip, rhinorrhea, sinus pressure, sinus pain, sneezing and sore throat. Negative for dental problem, drooling, ear discharge, facial swelling, hearing loss, mouth sores, nosebleeds, tinnitus, trouble swallowing and voice change.    Eyes: Negative for pain, discharge and redness.   Respiratory: Positive for cough and wheezing. Negative for choking, chest tightness and shortness of breath.    Cardiovascular: Negative for chest pain, palpitations and leg swelling.   Gastrointestinal: Negative for abdominal pain, diarrhea, nausea and vomiting.   Endocrine: Negative for cold intolerance and heat intolerance. "   Genitourinary: Negative for dysuria.   Musculoskeletal: Negative for arthralgias, joint pain, joint swelling, myalgias and neck pain.   Skin: Negative for rash.   Allergic/Immunologic: Positive for environmental allergies. Negative for food allergies and immunocompromised state.   Neurological: Positive for light-headedness. Negative for syncope and headaches.   Psychiatric/Behavioral: Positive for sleep disturbance.       Objective:      Physical Exam   Constitutional: She is oriented to person, place, and time. She appears well-developed and well-nourished. She is cooperative. She appears ill. No distress.   HENT:   Head: Normocephalic and atraumatic.   Right Ear: Tympanic membrane, external ear and ear canal normal.   Left Ear: Tympanic membrane, external ear and ear canal normal.   Nose: Mucosal edema and rhinorrhea present. Right sinus exhibits no maxillary sinus tenderness and no frontal sinus tenderness. Left sinus exhibits no maxillary sinus tenderness and no frontal sinus tenderness.   Mouth/Throat: Uvula is midline, oropharynx is clear and moist and mucous membranes are normal. No oropharyngeal exudate, posterior oropharyngeal edema or posterior oropharyngeal erythema.       Eyes: Conjunctivae are normal. Right eye exhibits no discharge. Left eye exhibits no discharge.   Neck: Normal range of motion.   Cardiovascular: Normal rate, regular rhythm and normal heart sounds.   No murmur heard.  Pulmonary/Chest: Effort normal. No accessory muscle usage or stridor. No respiratory distress. She has no decreased breath sounds. She has wheezes (scattered, expiratory, worse on right). She has no rhonchi. She has no rales. She exhibits no tenderness.   Frequent moist cough   Abdominal: Soft. She exhibits no distension.   Musculoskeletal: Normal range of motion.   Neurological: She is alert and oriented to person, place, and time.   Skin: Skin is warm and dry. No rash noted. She is not diaphoretic.   Psychiatric: She  has a normal mood and affect. Her behavior is normal. Judgment and thought content normal.   Nursing note and vitals reviewed.      Assessment:       1. Moderate persistent asthmatic bronchitis with acute exacerbation    2. Upper respiratory tract infection, unspecified type    3. Cough    4. Asthma, persistent controlled    5. Wheezing        Plan:       Neeru Paul was seen today for uri.    Diagnoses and all orders for this visit:    Moderate persistent asthmatic bronchitis with acute exacerbation  -     predniSONE (DELTASONE) 20 MG tablet; 3 pills for 3 days, 2 pills for 3 days, 1 pill for 3 days, 1/2 pill for 3 days then stop.  Reviewed xray with Dr. Bowling, no acute findings. Will do high dose steroid with taper. Likely asthmatic related. Albuterol q2h x 2 then q4h.   Off work until Monday  Follow up Friday if not improving     Upper respiratory tract infection, unspecified type  -     X-Ray Chest PA And Lateral; Future  -     albuterol-ipratropium 2.5 mg-0.5 mg/3 mL nebulizer solution 3 mL    Cough  -     promethazine-dextromethorphan (PROMETHAZINE-DM) 6.25-15 mg/5 mL Syrp; Take 5 mLs by mouth nightly as needed (Cough).    Asthma, persistent controlled  -     fluticasone-salmeterol 250-50 mcg/dose (ADVAIR DISKUS) 250-50 mcg/dose diskus inhaler; Inhale 1 puff into the lungs 2 (two) times daily.    Wheezing  -     albuterol-ipratropium 2.5 mg-0.5 mg/3 mL nebulizer solution 3 mL    X-Ray Chest PA And Lateral  Narrative: EXAMINATION:  XR CHEST PA AND LATERAL    CLINICAL HISTORY:  Other specified respiratory disorders    TECHNIQUE:  PA and lateral views of the chest were performed.    COMPARISON:  February 21, 2013    FINDINGS:  Heart size within normal limits.  Smooth prominence main pulmonary outflow tract.  Pulmonary vasculatures symmetric.  Trachea is midline and CP angles are clear.  Lungs are symmetrically aerated without consolidation.  Mild spondylosis.  Impression: Stable exam without acute  infiltrate.    Electronically signed by: Ruben Maier MD  Date:    11/27/2018  Time:    11:51

## 2018-11-27 NOTE — LETTER
November 27, 2018      [facility]  [address] [place] [address]  Phone: [phone]  Fax: [phone]       Patient: Neeru Gomez   YOB: 1980  Date of Visit: 11/27/2018    To Whom It May Concern:    Zack Gomez  was at [facility] on 11/27/2018. She may return to work/school on 11/03/2018 with no restrictions. If you have any questions or concerns, or if I can be of further assistance, please do not hesitate to contact me.    Sincerely,    Chantel Dash LPN

## 2018-11-27 NOTE — LETTER
November 27, 2018      [facility]  [address] [place] [address]  Phone: [phone]  Fax: [phone]       Patient: Neeru Gomez   YOB: 1980  Date of Visit: 11/27/2018    To Whom It May Concern:    Zack Gomez  was at [facility] on 11/27/2018. She may return to work/school on 12/03/2018 with no restrictions. If you have any questions or concerns, or if I can be of further assistance, please do not hesitate to contact me.    Sincerely,    Chantel Dash LPN

## 2018-11-30 ENCOUNTER — OFFICE VISIT (OUTPATIENT)
Dept: INTERNAL MEDICINE | Facility: CLINIC | Age: 38
End: 2018-11-30
Payer: COMMERCIAL

## 2018-11-30 VITALS
SYSTOLIC BLOOD PRESSURE: 124 MMHG | RESPIRATION RATE: 18 BRPM | HEART RATE: 95 BPM | TEMPERATURE: 97 F | DIASTOLIC BLOOD PRESSURE: 88 MMHG | HEIGHT: 64 IN | WEIGHT: 240.06 LBS | OXYGEN SATURATION: 98 % | BODY MASS INDEX: 40.98 KG/M2

## 2018-11-30 DIAGNOSIS — R06.2 WHEEZING: ICD-10-CM

## 2018-11-30 DIAGNOSIS — J45.41 MODERATE PERSISTENT ASTHMATIC BRONCHITIS WITH ACUTE EXACERBATION: Primary | ICD-10-CM

## 2018-11-30 DIAGNOSIS — R05.9 COUGH: ICD-10-CM

## 2018-11-30 PROCEDURE — 3008F BODY MASS INDEX DOCD: CPT | Mod: CPTII,S$GLB,, | Performed by: NURSE PRACTITIONER

## 2018-11-30 PROCEDURE — 99999 PR PBB SHADOW E&M-EST. PATIENT-LVL IV: CPT | Mod: PBBFAC,,, | Performed by: NURSE PRACTITIONER

## 2018-11-30 PROCEDURE — 99213 OFFICE O/P EST LOW 20 MIN: CPT | Mod: S$GLB,,, | Performed by: NURSE PRACTITIONER

## 2018-11-30 NOTE — PROGRESS NOTES
"Subjective:       Patient ID: Neeru Gomez is a 38 y.o. female.    Chief Complaint: Follow-up    Patient comes in for follow up of asthmatic bronchitis flare. She is on day 4 of prednisone taper. Feeling a little better every day. Sleeping a lot. No fevers. Using neb treatments prn. Using mucinex as well. Still coughing up sputum. Nebs help break up mucous. Also taking warm steamy showers.        /88 (BP Location: Left arm, Patient Position: Sitting, BP Method: Medium (Automatic))   Pulse 95   Temp 97 °F (36.1 °C) (Tympanic)   Resp 18   Ht 5' 4" (1.626 m)   Wt 108.9 kg (240 lb 1.3 oz)   SpO2 98%   BMI 41.21 kg/m²     Review of Systems   Constitutional: Positive for activity change and fatigue. Negative for appetite change, chills, diaphoresis, fever and unexpected weight change.   HENT: Positive for congestion, postnasal drip, rhinorrhea and sore throat. Negative for dental problem, drooling, ear discharge, ear pain, facial swelling, hearing loss, mouth sores, nosebleeds, sinus pressure, sinus pain, sneezing, tinnitus, trouble swallowing and voice change.    Eyes: Negative for pain, discharge and redness.   Respiratory: Positive for cough and wheezing (improving). Negative for apnea, choking, chest tightness, shortness of breath and stridor.    Cardiovascular: Negative for chest pain, palpitations and leg swelling.   Gastrointestinal: Negative for abdominal pain, diarrhea, nausea and vomiting.   Endocrine: Negative for cold intolerance and heat intolerance.   Genitourinary: Negative for dysuria.   Musculoskeletal: Negative for arthralgias, joint swelling, myalgias and neck pain.   Skin: Negative for rash.   Allergic/Immunologic: Positive for environmental allergies. Negative for food allergies and immunocompromised state.   Neurological: Negative for syncope, light-headedness and headaches.   Psychiatric/Behavioral: Negative for sleep disturbance.       Objective:      Physical Exam "   Constitutional: She is oriented to person, place, and time. She appears well-developed and well-nourished. She is cooperative. No distress.   HENT:   Head: Normocephalic and atraumatic.   Eyes: Conjunctivae are normal. Right eye exhibits no discharge. Left eye exhibits no discharge.   Cardiovascular: Normal rate, regular rhythm and normal heart sounds.   No murmur heard.  Pulmonary/Chest: Effort normal and breath sounds normal. No respiratory distress. She has no wheezes. She has no rales. She exhibits no tenderness.   Moist cough   Abdominal: Soft. She exhibits no distension.   Musculoskeletal: Normal range of motion.   Neurological: She is alert and oriented to person, place, and time.   Skin: Skin is warm and dry. No rash noted. She is not diaphoretic.   Psychiatric: She has a normal mood and affect. Her behavior is normal. Judgment and thought content normal.   Nursing note and vitals reviewed.      Assessment:       1. Moderate persistent asthmatic bronchitis with acute exacerbation    2. Wheezing    3. Cough        Plan:       Neeru Paul was seen today for follow-up.    Diagnoses and all orders for this visit:    Moderate persistent asthmatic bronchitis with acute exacerbation    Wheezing    Cough    improving with treatment plan.  Ok to return to work Monday  Patient anxious about returning Monday. If not improving Sunday may extend work note to be off Monday Tuesday as well but I feel based off patient's improvement since I saw her on Tuesday, she can return on Monday.

## 2018-12-02 ENCOUNTER — PATIENT MESSAGE (OUTPATIENT)
Dept: INTERNAL MEDICINE | Facility: CLINIC | Age: 38
End: 2018-12-02

## 2018-12-04 ENCOUNTER — OFFICE VISIT (OUTPATIENT)
Dept: INTERNAL MEDICINE | Facility: CLINIC | Age: 38
End: 2018-12-04
Payer: COMMERCIAL

## 2018-12-04 VITALS
WEIGHT: 237.88 LBS | DIASTOLIC BLOOD PRESSURE: 78 MMHG | BODY MASS INDEX: 40.61 KG/M2 | OXYGEN SATURATION: 99 % | SYSTOLIC BLOOD PRESSURE: 138 MMHG | HEIGHT: 64 IN | HEART RATE: 92 BPM

## 2018-12-04 DIAGNOSIS — J45.41 MODERATE PERSISTENT ASTHMA WITH ACUTE EXACERBATION: Primary | ICD-10-CM

## 2018-12-04 PROCEDURE — 3008F BODY MASS INDEX DOCD: CPT | Mod: CPTII,S$GLB,, | Performed by: NURSE PRACTITIONER

## 2018-12-04 PROCEDURE — 99999 PR PBB SHADOW E&M-EST. PATIENT-LVL IV: CPT | Mod: PBBFAC,,, | Performed by: NURSE PRACTITIONER

## 2018-12-04 PROCEDURE — 99213 OFFICE O/P EST LOW 20 MIN: CPT | Mod: S$GLB,,, | Performed by: NURSE PRACTITIONER

## 2018-12-04 RX ORDER — ALBUTEROL SULFATE 0.83 MG/ML
2.5 SOLUTION RESPIRATORY (INHALATION) EVERY 6 HOURS PRN
Qty: 1 BOX | Refills: 11 | Status: SHIPPED | OUTPATIENT
Start: 2018-12-04 | End: 2019-12-04

## 2018-12-04 NOTE — PROGRESS NOTES
"Subjective:       Patient ID: Neeru Gomez is a 38 y.o. female.    Chief Complaint: Cough    Patient comes in today for followup asthmatic bronchitis episode.  She is currently taking the prednisone as prescribed as well as doxycycline.  She finished the antibiotic today.  She has 3 days left of prednisone.  She is using albuterol treatments at home as needed.  No fevers.  She still has an occasional cough but feels that it is greatly improving.  She is ready to return to work tomorrow.  She is asking that I fill out her extended medically paperwork for her absence last week and for yesterday and today as well        /78 (BP Location: Right arm, Patient Position: Sitting)   Pulse 92   Ht 5' 4" (1.626 m)   Wt 107.9 kg (237 lb 14 oz)   SpO2 99%   BMI 40.83 kg/m²     Review of Systems   Constitutional: Negative for activity change, appetite change, chills, diaphoresis, fatigue, fever and unexpected weight change.   HENT: Positive for congestion, postnasal drip, rhinorrhea and sore throat. Negative for dental problem, drooling, ear discharge, ear pain, facial swelling, hearing loss, mouth sores, nosebleeds, sinus pressure, sinus pain, sneezing, tinnitus, trouble swallowing and voice change.    Eyes: Negative for pain, discharge, redness and visual disturbance.   Respiratory: Positive for cough and wheezing. Negative for apnea, choking, chest tightness, shortness of breath and stridor.    Cardiovascular: Negative for chest pain, palpitations and leg swelling.   Gastrointestinal: Negative for abdominal distention, abdominal pain, blood in stool, constipation, diarrhea, nausea and vomiting.   Endocrine: Negative for cold intolerance and heat intolerance.   Genitourinary: Negative for decreased urine volume, difficulty urinating, dysuria, frequency, hematuria and urgency.   Skin: Negative for rash.   Allergic/Immunologic: Positive for environmental allergies. Negative for food allergies and " immunocompromised state.   Neurological: Negative.  Negative for dizziness, syncope, speech difficulty, light-headedness and headaches.   Psychiatric/Behavioral: Negative for agitation, confusion, hallucinations and sleep disturbance. The patient is not nervous/anxious.        Objective:      Physical Exam   Constitutional: She is oriented to person, place, and time. She appears well-developed and well-nourished. She is cooperative. No distress.   HENT:   Head: Normocephalic and atraumatic.   Right Ear: Tympanic membrane, external ear and ear canal normal.   Left Ear: Tympanic membrane, external ear and ear canal normal.   Nose: Mucosal edema and rhinorrhea present. Right sinus exhibits no maxillary sinus tenderness and no frontal sinus tenderness. Left sinus exhibits no maxillary sinus tenderness and no frontal sinus tenderness.   Mouth/Throat: Uvula is midline, oropharynx is clear and moist and mucous membranes are normal.   Eyes: Conjunctivae are normal. Right eye exhibits no discharge. Left eye exhibits no discharge.   Cardiovascular: Normal rate, regular rhythm and normal heart sounds.   No murmur heard.  Pulmonary/Chest: Effort normal and breath sounds normal. No respiratory distress. She has no wheezes. She has no rales. She exhibits no tenderness.   Abdominal: Soft. She exhibits no distension.   Musculoskeletal: Normal range of motion.   Neurological: She is alert and oriented to person, place, and time.   Skin: Skin is warm and dry. No rash noted. She is not diaphoretic.   Psychiatric: She has a normal mood and affect. Her behavior is normal. Judgment and thought content normal.   Nursing note and vitals reviewed.      Assessment:       1. Moderate persistent asthma with acute exacerbation        Plan:       Neeru Paul was seen today for cough.    Diagnoses and all orders for this visit:    Moderate persistent asthma with acute exacerbation  -     albuterol (PROVENTIL) 2.5 mg /3 mL (0.083 %) nebulizer  solution; Take 3 mLs (2.5 mg total) by nebulization every 6 (six) hours as needed for Wheezing. Rescue    Finish doxycycline today.  Finish prednisone  albuterol p.r.n.  Return to work tomorrow  Forms completed.

## 2018-12-18 DIAGNOSIS — F98.8 ATTENTION DEFICIT DISORDER, UNSPECIFIED HYPERACTIVITY PRESENCE: ICD-10-CM

## 2018-12-18 RX ORDER — LISDEXAMFETAMINE DIMESYLATE 70 MG/1
CAPSULE ORAL
Qty: 30 CAPSULE | Refills: 0 | Status: SHIPPED | OUTPATIENT
Start: 2018-12-18 | End: 2018-12-28 | Stop reason: SDUPTHER

## 2018-12-28 ENCOUNTER — OFFICE VISIT (OUTPATIENT)
Dept: INTERNAL MEDICINE | Facility: CLINIC | Age: 38
End: 2018-12-28
Payer: COMMERCIAL

## 2018-12-28 VITALS
HEIGHT: 64 IN | SYSTOLIC BLOOD PRESSURE: 126 MMHG | RESPIRATION RATE: 18 BRPM | DIASTOLIC BLOOD PRESSURE: 64 MMHG | WEIGHT: 242.06 LBS | HEART RATE: 64 BPM | BODY MASS INDEX: 41.33 KG/M2 | TEMPERATURE: 98 F

## 2018-12-28 DIAGNOSIS — J30.2 SEASONAL ALLERGIC RHINITIS, UNSPECIFIED TRIGGER: Primary | ICD-10-CM

## 2018-12-28 DIAGNOSIS — H65.192 ACUTE MEE (MIDDLE EAR EFFUSION), LEFT: ICD-10-CM

## 2018-12-28 DIAGNOSIS — R09.82 PND (POST-NASAL DRIP): ICD-10-CM

## 2018-12-28 PROCEDURE — 99999 PR PBB SHADOW E&M-EST. PATIENT-LVL IV: CPT | Mod: PBBFAC,,, | Performed by: NURSE PRACTITIONER

## 2018-12-28 PROCEDURE — 3008F BODY MASS INDEX DOCD: CPT | Mod: CPTII,S$GLB,, | Performed by: NURSE PRACTITIONER

## 2018-12-28 PROCEDURE — 99214 OFFICE O/P EST MOD 30 MIN: CPT | Mod: S$GLB,,, | Performed by: NURSE PRACTITIONER

## 2018-12-28 RX ORDER — FLUTICASONE PROPIONATE 50 MCG
2 SPRAY, SUSPENSION (ML) NASAL DAILY
Qty: 1 BOTTLE | Refills: 0 | Status: SHIPPED | OUTPATIENT
Start: 2018-12-28

## 2018-12-28 RX ORDER — CETIRIZINE HYDROCHLORIDE, PSEUDOEPHEDRINE HYDROCHLORIDE 5; 120 MG/1; MG/1
5-120 TABLET, FILM COATED, EXTENDED RELEASE ORAL 2 TIMES DAILY
Qty: 20 TABLET | Refills: 0 | COMMUNITY
Start: 2018-12-28 | End: 2019-01-07

## 2018-12-28 RX ORDER — MONTELUKAST SODIUM 10 MG/1
10 TABLET ORAL DAILY
Qty: 90 TABLET | Refills: 3 | Status: SHIPPED | OUTPATIENT
Start: 2018-12-28

## 2018-12-28 NOTE — PROGRESS NOTES
"Subjective:       Patient ID: Neeru Gomez is a 38 y.o. female.    Chief Complaint: Otalgia (felt full, could not hear well)    Patient comes in today with concern of blood ear sensation to the left ear ongoing for a few days.  She states the ear is mildly tender as well.  She states that she thinks the ear popped in the middle the night in the plugged sensation is no longer there.  She has had nasal congestion, postnasal drip, rhinorrhea and sneezing ongoing for few weeks now.  She has issues with seasonal allergies as well as asthma.  Asthma is well controlled with inhalers.  Cough is improving.  No fever.        /64 (BP Location: Right arm, Patient Position: Sitting, BP Method: Medium (Automatic))   Pulse 64   Temp 98.2 °F (36.8 °C) (Tympanic)   Resp 18   Ht 5' 4" (1.626 m)   Wt 109.8 kg (242 lb 1 oz)   BMI 41.55 kg/m²     Review of Systems   Constitutional: Negative for activity change, appetite change, chills, diaphoresis, fatigue, fever and unexpected weight change.   HENT: Positive for congestion, ear pain (Resolved), postnasal drip, rhinorrhea and sneezing. Negative for dental problem, drooling, ear discharge, facial swelling, hearing loss, mouth sores, nosebleeds, sinus pressure, sinus pain, sore throat, tinnitus, trouble swallowing and voice change.    Eyes: Negative for pain, discharge, redness and visual disturbance.   Respiratory: Positive for cough. Negative for choking, chest tightness, shortness of breath and wheezing.    Cardiovascular: Negative for chest pain, palpitations and leg swelling.   Gastrointestinal: Negative for abdominal distention, abdominal pain, blood in stool, constipation, diarrhea, nausea and vomiting.   Endocrine: Negative for cold intolerance and heat intolerance.   Genitourinary: Negative for decreased urine volume, difficulty urinating, dysuria, frequency, hematuria and urgency.   Skin: Negative for rash.   Allergic/Immunologic: Positive for environmental " allergies. Negative for food allergies and immunocompromised state.   Neurological: Negative.  Negative for dizziness, syncope, speech difficulty, light-headedness and headaches.   Psychiatric/Behavioral: Negative for agitation, confusion and hallucinations. The patient is not nervous/anxious.        Objective:      Physical Exam   Constitutional: She is oriented to person, place, and time. She appears well-developed and well-nourished. No distress.   HENT:   Head: Normocephalic and atraumatic.   Right Ear: Tympanic membrane, external ear and ear canal normal.   Left Ear: External ear and ear canal normal. A middle ear effusion is present.   Nose: Mucosal edema and rhinorrhea present. Right sinus exhibits no maxillary sinus tenderness and no frontal sinus tenderness. Left sinus exhibits no maxillary sinus tenderness and no frontal sinus tenderness.   Mouth/Throat: Uvula is midline, oropharynx is clear and moist and mucous membranes are normal. No oropharyngeal exudate, posterior oropharyngeal edema or posterior oropharyngeal erythema.   Eyes: Conjunctivae are normal. Right eye exhibits no discharge. Left eye exhibits no discharge.   Neck: Normal range of motion.   Cardiovascular: Normal rate, regular rhythm and normal heart sounds.   No murmur heard.  Pulmonary/Chest: Effort normal and breath sounds normal. No accessory muscle usage. No respiratory distress. She has no decreased breath sounds. She has no wheezes. She has no rhonchi. She has no rales. She exhibits no tenderness.   Abdominal: Soft. She exhibits no distension.   Musculoskeletal: Normal range of motion.   Neurological: She is alert and oriented to person, place, and time.   Skin: Skin is warm and dry. She is not diaphoretic.   Psychiatric: She has a normal mood and affect. Her behavior is normal.   Nursing note and vitals reviewed.      Assessment:       1. Seasonal allergic rhinitis, unspecified trigger    2. PND (post-nasal drip)    3. Acute JAI  (middle ear effusion), left        Plan:       Neeru Paul was seen today for otalgia.    Diagnoses and all orders for this visit:    Seasonal allergic rhinitis, unspecified trigger  -     montelukast (SINGULAIR) 10 mg tablet; Take 1 tablet (10 mg total) by mouth once daily.  -     cetirizine-pseudoephedrine 5-120 mg Tb12; Take 5-120 mg by mouth 2 (two) times daily. for 10 days  -     fluticasone (FLONASE) 50 mcg/actuation nasal spray; 2 sprays (100 mcg total) by Each Nare route once daily.    PND (post-nasal drip)  -     montelukast (SINGULAIR) 10 mg tablet; Take 1 tablet (10 mg total) by mouth once daily.  -     cetirizine-pseudoephedrine 5-120 mg Tb12; Take 5-120 mg by mouth 2 (two) times daily. for 10 days  -     fluticasone (FLONASE) 50 mcg/actuation nasal spray; 2 sprays (100 mcg total) by Each Nare route once daily.    Acute JAI (middle ear effusion), left  -     montelukast (SINGULAIR) 10 mg tablet; Take 1 tablet (10 mg total) by mouth once daily.  -     cetirizine-pseudoephedrine 5-120 mg Tb12; Take 5-120 mg by mouth 2 (two) times daily. for 10 days  -     fluticasone (FLONASE) 50 mcg/actuation nasal spray; 2 sprays (100 mcg total) by Each Nare route once daily.    rest   Push fluids  If symptoms worsen or fail to improve with treatment, see your Primary Care Provider or go to the nearest Emergency Room.

## 2019-01-30 DIAGNOSIS — F98.8 ATTENTION DEFICIT DISORDER, UNSPECIFIED HYPERACTIVITY PRESENCE: ICD-10-CM

## 2019-01-30 RX ORDER — LISDEXAMFETAMINE DIMESYLATE 70 MG/1
CAPSULE ORAL
Qty: 30 CAPSULE | Refills: 0 | Status: SHIPPED | OUTPATIENT
Start: 2019-01-30 | End: 2019-04-01 | Stop reason: SDUPTHER

## 2019-02-20 ENCOUNTER — PATIENT MESSAGE (OUTPATIENT)
Dept: INTERNAL MEDICINE | Facility: CLINIC | Age: 39
End: 2019-02-20

## 2019-02-27 ENCOUNTER — TELEPHONE (OUTPATIENT)
Dept: INTERNAL MEDICINE | Facility: CLINIC | Age: 39
End: 2019-02-27

## 2019-02-27 ENCOUNTER — OFFICE VISIT (OUTPATIENT)
Dept: INTERNAL MEDICINE | Facility: CLINIC | Age: 39
End: 2019-02-27
Payer: COMMERCIAL

## 2019-02-27 VITALS
DIASTOLIC BLOOD PRESSURE: 88 MMHG | BODY MASS INDEX: 41.44 KG/M2 | TEMPERATURE: 98 F | HEIGHT: 64 IN | HEART RATE: 62 BPM | SYSTOLIC BLOOD PRESSURE: 124 MMHG | RESPIRATION RATE: 18 BRPM | WEIGHT: 242.75 LBS

## 2019-02-27 DIAGNOSIS — F41.1 GAD (GENERALIZED ANXIETY DISORDER): ICD-10-CM

## 2019-02-27 DIAGNOSIS — E88.819 INSULIN RESISTANCE: Primary | ICD-10-CM

## 2019-02-27 DIAGNOSIS — L60.0 INGROWN NAIL: ICD-10-CM

## 2019-02-27 DIAGNOSIS — F33.1 MODERATE EPISODE OF RECURRENT MAJOR DEPRESSIVE DISORDER: ICD-10-CM

## 2019-02-27 DIAGNOSIS — F41.1 ANXIETY STATE: ICD-10-CM

## 2019-02-27 DIAGNOSIS — J45.41 MODERATE PERSISTENT ASTHMA WITH ACUTE EXACERBATION: ICD-10-CM

## 2019-02-27 DIAGNOSIS — E03.9 ACQUIRED HYPOTHYROIDISM: ICD-10-CM

## 2019-02-27 DIAGNOSIS — E66.01 OBESITY, CLASS III, BMI 40-49.9 (MORBID OBESITY): ICD-10-CM

## 2019-02-27 PROCEDURE — 99214 OFFICE O/P EST MOD 30 MIN: CPT | Mod: S$GLB,,, | Performed by: NURSE PRACTITIONER

## 2019-02-27 PROCEDURE — 99999 PR PBB SHADOW E&M-EST. PATIENT-LVL V: ICD-10-PCS | Mod: PBBFAC,,, | Performed by: NURSE PRACTITIONER

## 2019-02-27 PROCEDURE — 3008F PR BODY MASS INDEX (BMI) DOCUMENTED: ICD-10-PCS | Mod: CPTII,S$GLB,, | Performed by: NURSE PRACTITIONER

## 2019-02-27 PROCEDURE — 99999 PR PBB SHADOW E&M-EST. PATIENT-LVL V: CPT | Mod: PBBFAC,,, | Performed by: NURSE PRACTITIONER

## 2019-02-27 PROCEDURE — 99214 PR OFFICE/OUTPT VISIT, EST, LEVL IV, 30-39 MIN: ICD-10-PCS | Mod: S$GLB,,, | Performed by: NURSE PRACTITIONER

## 2019-02-27 PROCEDURE — 3008F BODY MASS INDEX DOCD: CPT | Mod: CPTII,S$GLB,, | Performed by: NURSE PRACTITIONER

## 2019-02-27 RX ORDER — MUPIROCIN 20 MG/G
OINTMENT TOPICAL 3 TIMES DAILY
Qty: 30 G | Refills: 0 | Status: SHIPPED | OUTPATIENT
Start: 2019-02-27 | End: 2019-03-09

## 2019-02-27 NOTE — Clinical Note
Podiatry referral in and antibiotic ointment at  pharmacy. Please notify and schedule podiatry at patient's convenience

## 2019-02-27 NOTE — PROGRESS NOTES
"Subjective:       Patient ID: Neeru Gomez is a 38 y.o. female.    Chief Complaint: Follow-up    Patient comes in today for follow up thyroid. She was started on levothyroxine 50mg in July due to low t4. She is doing well with no side effects. Patient denies palpitations, diarrhea, constipation, jitteriness or heat or cold intolerance. She is doing well. Wants to get labs to recheck things.     From an asthma stand point she is doing well with advair and singulair, has used albuterol once or twice the last few weeks.     From a mood standpoint, she is stable on lexapro and pristiq. She uses ativan prn.  is in [place]. She is going to be moving her family at the end of may to join him.      She has a hx of IR. Taking only 1 metformin HS. Not watching diet, not exercising.      ADD is stable on Vyvanse.      She has an infection on her great toe, not sure what she should do. She states this happened when she banged her foot on something and the nail broke. She tried epsom salt soaks.     Wt Readings from Last 10 Encounters:  02/27/19 : 110.1 kg (242 lb 11.6 oz)  12/28/18 : 109.8 kg (242 lb 1 oz)  12/04/18 : 107.9 kg (237 lb 14 oz)  11/30/18 : 108.9 kg (240 lb 1.3 oz)  11/27/18 : 107.4 kg (236 lb 12.4 oz)  11/24/18 : 100.6 kg (221 lb 12.5 oz)  11/17/18 : 106 kg (233 lb 11 oz)  10/16/18 : 106.7 kg (235 lb 3.7 oz)  09/01/18 : 103.7 kg (228 lb 9.9 oz)  07/17/18 : 107.3 kg (236 lb 8.9 oz)              /88 (BP Location: Left arm, Patient Position: Sitting, BP Method: Medium (Automatic))   Pulse 62   Temp 97.6 °F (36.4 °C) (Tympanic)   Resp 18   Ht 5' 4" (1.626 m)   Wt 110.1 kg (242 lb 11.6 oz)   BMI 41.66 kg/m²     Review of Systems   Constitutional: Negative for activity change, appetite change, chills, diaphoresis, fatigue, fever and unexpected weight change.   HENT: Negative.    Eyes: Negative for visual disturbance.   Respiratory: Negative for cough, shortness of breath and wheezing.  "   Cardiovascular: Negative for chest pain, palpitations and leg swelling.   Gastrointestinal: Negative for abdominal distention, abdominal pain, blood in stool, constipation, diarrhea, nausea and vomiting.   Genitourinary: Negative for decreased urine volume, difficulty urinating, dysuria, frequency, hematuria and urgency.   Musculoskeletal: Positive for arthralgias and gait problem. Negative for back pain, joint swelling, myalgias, neck pain and neck stiffness.   Skin: Positive for color change and wound. Negative for pallor and rash.   Neurological: Negative for dizziness, syncope, speech difficulty, light-headedness and headaches.   Psychiatric/Behavioral: Negative for agitation, confusion and hallucinations. The patient is not nervous/anxious.        Objective:      Physical Exam   Constitutional: She is oriented to person, place, and time. She appears well-developed and well-nourished. She is cooperative. No distress.   HENT:   Head: Normocephalic and atraumatic.   Eyes: Conjunctivae are normal. Right eye exhibits no discharge. Left eye exhibits no discharge.   Cardiovascular: Normal rate, regular rhythm and normal heart sounds.   No murmur heard.  Pulmonary/Chest: Effort normal and breath sounds normal. No respiratory distress. She has no wheezes. She has no rales. She exhibits no tenderness.   Abdominal: Soft. She exhibits no distension.   Musculoskeletal: Normal range of motion.   Great toe with ingrown nail with erythema and tenderness.   Neurological: She is alert and oriented to person, place, and time.   Skin: Skin is warm and dry. No rash noted. She is not diaphoretic.   Psychiatric: She has a normal mood and affect. Her behavior is normal. Judgment and thought content normal.   Nursing note and vitals reviewed.      Assessment:       1. Insulin resistance    2. Moderate persistent asthma with acute exacerbation    3. Anxiety state    4. Obesity, Class III, BMI 40-49.9 (morbid obesity)    5. Acquired  hypothyroidism    6. MYKE (generalized anxiety disorder)    7. Moderate episode of recurrent major depressive disorder    8. Ingrown nail        Plan:       Neeru Paul was seen today for follow-up.    Diagnoses and all orders for this visit:    Insulin resistance  -     blood sugar diagnostic Strp; To be used with patient's meter to check glucose in am    Moderate persistent asthma with acute exacerbation    Anxiety state    Obesity, Class III, BMI 40-49.9 (morbid obesity)    Acquired hypothyroidism    MYKE (generalized anxiety disorder)    Moderate episode of recurrent major depressive disorder    Ingrown nail  -     Ambulatory referral to Podiatry  -     mupirocin (BACTROBAN) 2 % ointment; Apply topically 3 (three) times daily. for 10 days    will come in and get fasting labs on Friday  IR Diet discussed in detail  Exercise discussed  Will see what labs look like but may need to increase metformin as she is only taking 1 qhs  Follow up in May before move for refills.  Epsom salt soaks prn

## 2019-02-27 NOTE — TELEPHONE ENCOUNTER
----- Message from XIOMARA Blanco sent at 2/27/2019  5:20 PM CST -----  Podiatry referral in and antibiotic ointment at  pharmacy. Please notify and schedule podiatry at patient's convenience

## 2019-02-28 NOTE — TELEPHONE ENCOUNTER
Spoke to pt. Pt wants to hold off on seeing podiatry for now. She said she will try ointment for a week and let us know if she wants to see podiatry.

## 2019-03-01 ENCOUNTER — LAB VISIT (OUTPATIENT)
Dept: LAB | Facility: HOSPITAL | Age: 39
End: 2019-03-01
Attending: NURSE PRACTITIONER
Payer: COMMERCIAL

## 2019-03-01 DIAGNOSIS — F41.1 ANXIETY STATE: ICD-10-CM

## 2019-03-01 DIAGNOSIS — E88.819 INSULIN RESISTANCE: ICD-10-CM

## 2019-03-01 DIAGNOSIS — E03.9 ACQUIRED HYPOTHYROIDISM: ICD-10-CM

## 2019-03-01 DIAGNOSIS — E66.01 OBESITY, CLASS III, BMI 40-49.9 (MORBID OBESITY): ICD-10-CM

## 2019-03-01 LAB
ALBUMIN SERPL BCP-MCNC: 3.9 G/DL
ALP SERPL-CCNC: 59 U/L
ALT SERPL W/O P-5'-P-CCNC: 18 U/L
ANION GAP SERPL CALC-SCNC: 9 MMOL/L
AST SERPL-CCNC: 18 U/L
BASOPHILS # BLD AUTO: 0.05 K/UL
BASOPHILS NFR BLD: 0.5 %
BILIRUB SERPL-MCNC: 0.8 MG/DL
BUN SERPL-MCNC: 12 MG/DL
CALCIUM SERPL-MCNC: 9.9 MG/DL
CHLORIDE SERPL-SCNC: 101 MMOL/L
CHOLEST SERPL-MCNC: 212 MG/DL
CHOLEST/HDLC SERPL: 3.3 {RATIO}
CO2 SERPL-SCNC: 31 MMOL/L
CREAT SERPL-MCNC: 0.8 MG/DL
DIFFERENTIAL METHOD: ABNORMAL
EOSINOPHIL # BLD AUTO: 0 K/UL
EOSINOPHIL NFR BLD: 0 %
ERYTHROCYTE [DISTWIDTH] IN BLOOD BY AUTOMATED COUNT: 12.8 %
EST. GFR  (AFRICAN AMERICAN): >60 ML/MIN/1.73 M^2
EST. GFR  (NON AFRICAN AMERICAN): >60 ML/MIN/1.73 M^2
ESTIMATED AVG GLUCOSE: 100 MG/DL
GLUCOSE SERPL-MCNC: 77 MG/DL
HBA1C MFR BLD HPLC: 5.1 %
HCT VFR BLD AUTO: 44.2 %
HDLC SERPL-MCNC: 65 MG/DL
HDLC SERPL: 30.7 %
HGB BLD-MCNC: 14 G/DL
IMM GRANULOCYTES # BLD AUTO: 0.03 K/UL
IMM GRANULOCYTES NFR BLD AUTO: 0.3 %
LDLC SERPL CALC-MCNC: 130 MG/DL
LYMPHOCYTES # BLD AUTO: 2 K/UL
LYMPHOCYTES NFR BLD: 21.1 %
MCH RBC QN AUTO: 32.3 PG
MCHC RBC AUTO-ENTMCNC: 31.7 G/DL
MCV RBC AUTO: 102 FL
MONOCYTES # BLD AUTO: 1.1 K/UL
MONOCYTES NFR BLD: 11.1 %
NEUTROPHILS # BLD AUTO: 6.5 K/UL
NEUTROPHILS NFR BLD: 67 %
NONHDLC SERPL-MCNC: 147 MG/DL
NRBC BLD-RTO: 0 /100 WBC
PLATELET # BLD AUTO: 390 K/UL
PMV BLD AUTO: 9.6 FL
POTASSIUM SERPL-SCNC: 4.2 MMOL/L
PROT SERPL-MCNC: 7.2 G/DL
RBC # BLD AUTO: 4.34 M/UL
SODIUM SERPL-SCNC: 141 MMOL/L
T4 FREE SERPL-MCNC: 1.01 NG/DL
TRIGL SERPL-MCNC: 85 MG/DL
TSH SERPL DL<=0.005 MIU/L-ACNC: 2.18 UIU/ML
WBC # BLD AUTO: 9.64 K/UL

## 2019-03-01 PROCEDURE — 83036 HEMOGLOBIN GLYCOSYLATED A1C: CPT

## 2019-03-01 PROCEDURE — 83525 ASSAY OF INSULIN: CPT

## 2019-03-01 PROCEDURE — 80061 LIPID PANEL: CPT

## 2019-03-01 PROCEDURE — 84439 ASSAY OF FREE THYROXINE: CPT

## 2019-03-01 PROCEDURE — 80053 COMPREHEN METABOLIC PANEL: CPT

## 2019-03-01 PROCEDURE — 85025 COMPLETE CBC W/AUTO DIFF WBC: CPT

## 2019-03-01 PROCEDURE — 84443 ASSAY THYROID STIM HORMONE: CPT

## 2019-03-02 LAB
INSULIN COLLECTION INTERVAL: NORMAL
INSULIN SERPL-ACNC: 8.5 UU/ML

## 2019-03-06 ENCOUNTER — OFFICE VISIT (OUTPATIENT)
Dept: PODIATRY | Facility: CLINIC | Age: 39
End: 2019-03-06
Payer: COMMERCIAL

## 2019-03-06 VITALS
HEART RATE: 71 BPM | SYSTOLIC BLOOD PRESSURE: 117 MMHG | BODY MASS INDEX: 41.1 KG/M2 | HEIGHT: 64 IN | WEIGHT: 240.75 LBS | DIASTOLIC BLOOD PRESSURE: 72 MMHG

## 2019-03-06 DIAGNOSIS — L60.0 ONYCHOCRYPTOSIS: Primary | ICD-10-CM

## 2019-03-06 PROCEDURE — 99243 PR OFFICE CONSULTATION,LEVEL III: ICD-10-PCS | Mod: 25,S$GLB,, | Performed by: PODIATRIST

## 2019-03-06 PROCEDURE — 99999 PR PBB SHADOW E&M-EST. PATIENT-LVL III: ICD-10-PCS | Mod: PBBFAC,,, | Performed by: PODIATRIST

## 2019-03-06 PROCEDURE — 11730 AVULSION NAIL PLATE SIMPLE 1: CPT | Mod: S$GLB,,, | Performed by: PODIATRIST

## 2019-03-06 PROCEDURE — 11730 PR REMOVAL OF NAIL PLATE: ICD-10-PCS | Mod: S$GLB,,, | Performed by: PODIATRIST

## 2019-03-06 PROCEDURE — 99999 PR PBB SHADOW E&M-EST. PATIENT-LVL III: CPT | Mod: PBBFAC,,, | Performed by: PODIATRIST

## 2019-03-06 PROCEDURE — 99243 OFF/OP CNSLTJ NEW/EST LOW 30: CPT | Mod: 25,S$GLB,, | Performed by: PODIATRIST

## 2019-03-06 NOTE — LETTER
March 6, 2019      Kaelyn Brumfield, FNP-C  [address] [place] [address]           [facility]  [address] [facility] [address] [place] [address]  Phone: [phone]  Fax: [phone]          Patient: Neeru Gomez   MR Number: [id number]   YOB: 1980   Date of Visit: 3/6/2019       Dear Kaelyn Brumfield:    Thank you for referring Neeru Gomez to me for evaluation. Attached you will find relevant portions of my assessment and plan of care.    If you have questions, please do not hesitate to call me. I look forward to following Neeru Gomez along with you.    Sincerely,    Serena Morrow, JUANITO    Enclosure  CC:  No Recipients    If you would like to receive this communication electronically, please contact [email or web address] or [phone] to request more information on [employer] Link access.    For providers and/or their staff who would like to refer a patient to [facility], please contact us through our one-stop-shop provider referral line, [facility], at [phone].    If you feel you have received this communication in error or would no longer like to receive these types of communications, please e-mail [email or web address]

## 2019-03-06 NOTE — PROGRESS NOTES
PODIATRIC MEDICINE AND SURGERY   3/6/2019    Reason for Visit   Chief Complaint   Patient presents with    Ingrown Toenail     ingrown toenail on right hallux           HPI  This is Neeru Gomez is a 38 y.o. female who presents today complaining of painful ingrown toenail. Pt states painful toenail for Right great toe.  Previous treatment includes none. She states she bumped her toe which worsened redness , swelling,and pain. Pt would like to proceed with nail removal. Patient denies other pedal complaints at this time. Denies any N/V/F/C/SOB/CP.     PMH  Patient Active Problem List    Diagnosis Date Noted    Acquired hypothyroidism 10/16/2018    Mood disorder 07/17/2018    Insulin resistance 07/17/2018    Asthma, intermittent 03/18/2014    Anxiety state 03/18/2014    Osteoarthritis 01/07/2014    ADD (attention deficit disorder) 01/07/2014     Past Medical History:   Diagnosis Date    ADD (attention deficit disorder)     diagnosed in HS by Clinical Psychologist    Anxiety     Anxiety state 3/18/2014    Intermittent asthma     OA (osteoarthritis) of knee        MEDS  Current Outpatient Medications on File Prior to Visit   Medication Sig Dispense Refill    albuterol (PROVENTIL) 2.5 mg /3 mL (0.083 %) nebulizer solution Take 3 mLs (2.5 mg total) by nebulization every 6 (six) hours as needed for Wheezing. Rescue 1 Box 11    albuterol 90 mcg/actuation inhaler Inhale 2 puffs into the lungs every 6 (six) hours as needed for Wheezing. Rescue ok for ventolin or proair 18 g 3    blood sugar diagnostic Strp To be used with patient's meter to check glucose in am 100 each 11    cyclobenzaprine (FLEXERIL) 10 MG tablet Take 1 tablet (10 mg total) by mouth 3 (three) times daily as needed. 30 tablet 2    desvenlafaxine succinate (PRISTIQ) 50 MG Tb24 take 1 tablet daily 30 tablet 11    escitalopram oxalate (LEXAPRO) 10 MG tablet take 1 tablet every evening 90 tablet 3    fluticasone (FLONASE) 50  mcg/actuation nasal spray 2 sprays (100 mcg total) by Each Nare route once daily. 1 Bottle 0    fluticasone-salmeterol 250-50 mcg/dose (ADVAIR DISKUS) 250-50 mcg/dose diskus inhaler Inhale 1 puff into the lungs 2 (two) times daily. 60 each 11    levothyroxine (SYNTHROID) 50 MCG tablet Take 1 tablet (50 mcg total) by mouth once daily. 30 tablet 11    LORazepam (ATIVAN) 0.5 MG tablet Take 1 tablet (0.5 mg total) by mouth every 12 (twelve) hours as needed for Anxiety (panic attack). 30 tablet 0    metFORMIN (GLUCOPHAGE-XR) 500 MG 24 hr tablet Take 2 tablets (1,000 mg total) by mouth daily with breakfast. (Patient taking differently: Take 500 mg by mouth daily with breakfast. ) 60 tablet 6    montelukast (SINGULAIR) 10 mg tablet Take 1 tablet (10 mg total) by mouth once daily. 90 tablet 3    mupirocin (BACTROBAN) 2 % ointment Apply topically 3 (three) times daily. for 10 days 30 g 0    promethazine-dextromethorphan (PROMETHAZINE-DM) 6.25-15 mg/5 mL Syrp Take 5 mLs by mouth nightly as needed (Cough). 240 mL 0    VYVANSE 70 mg capsule take 1 capsule daily 30 capsule 0     No current facility-administered medications on file prior to visit.        PSH     Past Surgical History:   Procedure Laterality Date    APPENDECTOMY       SECTION      HYSTERECTOMY      KNEE SURGERY      5 in total    nerve ablation      TONSILLECTOMY          ALL  Review of patient's allergies indicates:   Allergen Reactions    Hydrocodone Hives       SOC     Social History     Tobacco Use    Smoking status: Never Smoker    Smokeless tobacco: Never Used   Substance Use Topics    Alcohol use: Yes     Alcohol/week: 0.0 oz     Comment: occassional    Drug use: No       Family HX  No family history on file.       REVIEW OF SYSTEMS   General: This patient is well-developed, well-nourished and appears stated age, well-oriented to person, place and time, and cooperative and pleasant on today's visit  Constitutional: Negative for  "chills and fever.   Respiratory: Negative for shortness of breath.    Cardiovascular: Negative for chest pain, palpitations, orthopnea  Gastrointestinal: Negative for diarrhea, nausea and vomiting.   Musculoskeletal: Positive for above noted in HPI  Skin: Positive for nail changes   Peripheral Vascular: no claudication or cyanosis  Psychiatric/Behavioral: Negative for altered mental status       Vitals:    03/06/19 0722   BP: 117/72   Pulse: 71   Weight: 109.2 kg (240 lb 11.9 oz)   Height: 5' 4" (1.626 m)   PainSc:   8       LOWER EXTREMITY PHYSICAL EXAM    VASCULAR  Dorsalis pedis and posterior tibial pulses palpable 2/4 bilaterally.   Capillary refill time immediate to the toes.   Feet are warm to the touch. Skin temperature warm to warm from proximally to distally   There are no ecchymoses noted to bilateral foot and ankle regions.   There is  edema noted to border right hallux    DERMATOLOGIC  Skin moist with healthy texture and turgor.  Incurvated right hallux medial border with mild erythema and dry drainage.   There are no open ulcerations, lacerations, or fissures to bilateral foot and ankle regions.   There are no hyperkeratotic lesions noted to feet. Nails are well-trimmed.    NEUROLOGIC  Epicritic sensation is intact as the patient is able to sense light touch to bilateral foot and ankle regions.   Achilles and patellar deep tendon reflexes intact  Babinski reflex absent    ORTHOPEDIC/BIOMECHANICAL  Tenderness to palpation medial border of right hallux.   Muscle strength AT/EHL/EDL/PT: 5/5; Achilles/Gastroc/Soleus: 5/5; PB/PL: 5/5 Muscle tone is normal.  Ankle joint ROM INTACT DF/PF, non-crepitus    ASSESSMENT  1. Onychocryptosis , Right Hallux     PLAN    -Discussed presenting problems, etiology, pathologic processes and management options with patient today.   -Patient agrees to proceed with Right Hallux Partial Nail Avulsion. Risks/benefits/possible complications discussed. Verbal and written consent " was signed and witnessed by LPN.    Procedure : Partial Nail Avulsion, Right Hallux    The hallux was anesthesized with 3 cc of 1% Lidocaine plain. The area was prepped with betadine scrub and then painted with iodine. A sterile tournicot was placed for hemostasis. The affected border of the involved nail bed was freed from the skin edge and nail bed. Attention was then directed to the affected nail border to separate it from the nail bed. The most proximal nail border at the eponychium was released to the area of the matrix. Using an English anvil, a cut was then made to the nail fold in a longitudinal manner at the distal aspect extending to the proximal aspect of the nail plate thereby releasing the most proximal aspect of the nail plate from the nail bed. Using a straight hemostat, the free nail plate segment was clamped and removed. Using a tissue nipper, residual tissue was then removed. The nail groove area was inspected and probed proximally with a curette for any spicules. No spicules were present. The area was then flushed with sterile saline. The tournicot was removed and hyperemia was noted to the digit. The patient tolerated the procedure well. The toe was dressed with antibiotic ointment, gauze, phillip and coban.    -Patient received instructions for post op care, pain management with OTC analgesics, and soaking.  -If any signs of infection--increased redness, purulent drainage, increased pain then patient is advised to return to clinic or ER if after clinic hours. Advised to limit activities.  -RTC in 2 weeks for f/u visit, or sooner if any signs of infection noted.      Future Appointments   Date Time Provider Department Center   3/27/2019  8:00 AM Serena Morrow DPM [facility] POD [place]       Report Electronically Signed By:     Serena Morrow DPM   Podiatric Medicine & Surgery  [facility] [place] [place]  3/6/2019

## 2019-03-19 ENCOUNTER — OFFICE VISIT (OUTPATIENT)
Dept: INTERNAL MEDICINE | Facility: CLINIC | Age: 39
End: 2019-03-19
Payer: COMMERCIAL

## 2019-03-19 VITALS
HEIGHT: 64 IN | TEMPERATURE: 99 F | SYSTOLIC BLOOD PRESSURE: 122 MMHG | WEIGHT: 240.5 LBS | DIASTOLIC BLOOD PRESSURE: 80 MMHG | HEART RATE: 80 BPM | BODY MASS INDEX: 41.06 KG/M2

## 2019-03-19 DIAGNOSIS — R50.9 FEVER, UNSPECIFIED FEVER CAUSE: ICD-10-CM

## 2019-03-19 DIAGNOSIS — R05.9 COUGH: ICD-10-CM

## 2019-03-19 DIAGNOSIS — B34.9 VIRAL SYNDROME: Primary | ICD-10-CM

## 2019-03-19 LAB
CTP QC/QA: YES
CTP QC/QA: YES
POC MOLECULAR INFLUENZA A AGN: NEGATIVE
POC MOLECULAR INFLUENZA B AGN: NEGATIVE
S PYO RRNA THROAT QL PROBE: NEGATIVE

## 2019-03-19 PROCEDURE — 99213 OFFICE O/P EST LOW 20 MIN: CPT | Mod: S$GLB,,, | Performed by: NURSE PRACTITIONER

## 2019-03-19 PROCEDURE — 99999 PR PBB SHADOW E&M-EST. PATIENT-LVL IV: CPT | Mod: PBBFAC,,, | Performed by: NURSE PRACTITIONER

## 2019-03-19 PROCEDURE — 3008F BODY MASS INDEX DOCD: CPT | Mod: CPTII,S$GLB,, | Performed by: NURSE PRACTITIONER

## 2019-03-19 PROCEDURE — 87502 POCT INFLUENZA A/B MOLECULAR: ICD-10-PCS | Mod: QW,S$GLB,, | Performed by: NURSE PRACTITIONER

## 2019-03-19 PROCEDURE — 3008F PR BODY MASS INDEX (BMI) DOCUMENTED: ICD-10-PCS | Mod: CPTII,S$GLB,, | Performed by: NURSE PRACTITIONER

## 2019-03-19 PROCEDURE — 87147 CULTURE TYPE IMMUNOLOGIC: CPT

## 2019-03-19 PROCEDURE — 99213 PR OFFICE/OUTPT VISIT, EST, LEVL III, 20-29 MIN: ICD-10-PCS | Mod: S$GLB,,, | Performed by: NURSE PRACTITIONER

## 2019-03-19 PROCEDURE — 87081 CULTURE SCREEN ONLY: CPT

## 2019-03-19 PROCEDURE — 87880 POCT RAPID STREP A: ICD-10-PCS | Mod: QW,S$GLB,, | Performed by: NURSE PRACTITIONER

## 2019-03-19 PROCEDURE — 87502 INFLUENZA DNA AMP PROBE: CPT | Mod: QW,S$GLB,, | Performed by: NURSE PRACTITIONER

## 2019-03-19 PROCEDURE — 99999 PR PBB SHADOW E&M-EST. PATIENT-LVL IV: ICD-10-PCS | Mod: PBBFAC,,, | Performed by: NURSE PRACTITIONER

## 2019-03-19 PROCEDURE — 87880 STREP A ASSAY W/OPTIC: CPT | Mod: QW,S$GLB,, | Performed by: NURSE PRACTITIONER

## 2019-03-19 RX ORDER — PROMETHAZINE HYDROCHLORIDE AND DEXTROMETHORPHAN HYDROBROMIDE 6.25; 15 MG/5ML; MG/5ML
5 SYRUP ORAL NIGHTLY PRN
Qty: 240 ML | Refills: 0 | Status: SHIPPED | OUTPATIENT
Start: 2019-03-19

## 2019-03-19 NOTE — PROGRESS NOTES
"Subjective:       Patient ID: Neeru Gomez is a 38 y.o. female.    Chief Complaint: Fever    Left school 1/2 day yesterday with fever 101. Took tylenol. Slept till 11 today. Aches started. 100.8 today. Took more tylenol. Has chills. Has a dry cough. Has nasal congestion and post nasal drip. Works at a school.         /80   Pulse 80   Temp 99.2 °F (37.3 °C) (Tympanic)   Ht 5' 4" (1.626 m)   Wt 109.1 kg (240 lb 8.4 oz)   BMI 41.29 kg/m²     Review of Systems   Constitutional: Positive for activity change, chills, fatigue and fever. Negative for appetite change, diaphoresis and unexpected weight change.   HENT: Positive for congestion, ear pain, postnasal drip, rhinorrhea, sinus pressure, sinus pain, sneezing and sore throat (5-6/10). Negative for dental problem, drooling, ear discharge, facial swelling, hearing loss, mouth sores, nosebleeds, tinnitus, trouble swallowing and voice change.    Eyes: Negative for visual disturbance.   Respiratory: Negative for cough, shortness of breath and wheezing.    Cardiovascular: Negative for chest pain, palpitations and leg swelling.   Gastrointestinal: Negative for abdominal distention, abdominal pain, blood in stool, constipation, diarrhea, nausea and vomiting.   Genitourinary: Negative for decreased urine volume, difficulty urinating, dysuria, frequency, hematuria and urgency.   Musculoskeletal: Positive for myalgias. Negative for arthralgias, back pain, gait problem, joint swelling, neck pain and neck stiffness.   Skin: Negative for color change, pallor, rash and wound.   Neurological: Negative.  Negative for dizziness, syncope, speech difficulty, light-headedness and headaches.   Psychiatric/Behavioral: Negative for agitation, confusion and hallucinations. The patient is not nervous/anxious.        Objective:      Physical Exam   Constitutional: She is oriented to person, place, and time. She appears well-developed and well-nourished. No distress.   HENT: "   Head: Normocephalic and atraumatic.   Right Ear: Tympanic membrane, external ear and ear canal normal.   Left Ear: Tympanic membrane, external ear and ear canal normal.   Nose: Mucosal edema and rhinorrhea present. Right sinus exhibits no maxillary sinus tenderness and no frontal sinus tenderness. Left sinus exhibits no maxillary sinus tenderness and no frontal sinus tenderness.   Mouth/Throat: Uvula is midline, oropharynx is clear and moist and mucous membranes are normal. No oropharyngeal exudate, posterior oropharyngeal edema or posterior oropharyngeal erythema.   Eyes: Conjunctivae are normal. Right eye exhibits no discharge. Left eye exhibits no discharge.   Neck: Normal range of motion.   Cardiovascular: Normal rate, regular rhythm and normal heart sounds.   No murmur heard.  Pulmonary/Chest: Effort normal and breath sounds normal. No accessory muscle usage. No respiratory distress. She has no decreased breath sounds. She has no wheezes. She has no rhonchi. She has no rales. She exhibits no tenderness.   Abdominal: Soft. She exhibits no distension.   Musculoskeletal: Normal range of motion.   Neurological: She is alert and oriented to person, place, and time.   Skin: Skin is warm and dry. She is not diaphoretic.   Psychiatric: She has a normal mood and affect. Her behavior is normal.   Nursing note and vitals reviewed.      Assessment:       1. Viral syndrome    2. Fever, unspecified fever cause    3. Cough        Plan:       Neeru Paul was seen today for fever.    Diagnoses and all orders for this visit:    Viral syndrome  -     promethazine-dextromethorphan (PROMETHAZINE-DM) 6.25-15 mg/5 mL Syrp; Take 5 mLs by mouth nightly as needed (Cough).    Fever, unspecified fever cause  -     POCT Influenza A/B Molecular  -     POCT rapid strep A  -     Strep A culture, throat    Cough  -     promethazine-dextromethorphan (PROMETHAZINE-DM) 6.25-15 mg/5 mL Syrp; Take 5 mLs by mouth nightly as needed (Cough).    strep  Negative  Flu Negative  Supportive care  Push fluids  Rest

## 2019-03-20 ENCOUNTER — PATIENT MESSAGE (OUTPATIENT)
Dept: INTERNAL MEDICINE | Facility: CLINIC | Age: 39
End: 2019-03-20

## 2019-03-22 ENCOUNTER — PATIENT MESSAGE (OUTPATIENT)
Dept: INTERNAL MEDICINE | Facility: CLINIC | Age: 39
End: 2019-03-22

## 2019-03-22 DIAGNOSIS — J02.0 STREP PHARYNGITIS: Primary | ICD-10-CM

## 2019-03-22 LAB — BACTERIA THROAT CULT: NORMAL

## 2019-03-22 RX ORDER — PENICILLIN V POTASSIUM 500 MG/1
500 TABLET, FILM COATED ORAL 2 TIMES DAILY
Qty: 20 TABLET | Refills: 0 | Status: SHIPPED | OUTPATIENT
Start: 2019-03-22 | End: 2019-04-01

## 2019-04-01 DIAGNOSIS — F98.8 ATTENTION DEFICIT DISORDER, UNSPECIFIED HYPERACTIVITY PRESENCE: ICD-10-CM

## 2019-04-02 RX ORDER — LISDEXAMFETAMINE DIMESYLATE 70 MG/1
CAPSULE ORAL
Qty: 30 CAPSULE | Refills: 0 | Status: SHIPPED | OUTPATIENT
Start: 2019-04-02 | End: 2019-05-18 | Stop reason: SDUPTHER

## 2019-04-24 DIAGNOSIS — E88.819 INSULIN RESISTANCE: ICD-10-CM

## 2019-04-24 RX ORDER — METFORMIN HYDROCHLORIDE 500 MG/1
1000 TABLET, EXTENDED RELEASE ORAL
Qty: 60 TABLET | Refills: 6 | Status: SHIPPED | OUTPATIENT
Start: 2019-04-24 | End: 2020-04-23

## 2019-05-18 DIAGNOSIS — F98.8 ATTENTION DEFICIT DISORDER, UNSPECIFIED HYPERACTIVITY PRESENCE: ICD-10-CM

## 2019-05-20 RX ORDER — LISDEXAMFETAMINE DIMESYLATE 70 MG/1
CAPSULE ORAL
Qty: 30 CAPSULE | Refills: 0 | Status: SHIPPED | OUTPATIENT
Start: 2019-05-20 | End: 2019-06-19 | Stop reason: SDUPTHER

## 2019-06-19 DIAGNOSIS — F98.8 ATTENTION DEFICIT DISORDER, UNSPECIFIED HYPERACTIVITY PRESENCE: ICD-10-CM

## 2019-06-19 RX ORDER — LISDEXAMFETAMINE DIMESYLATE 70 MG/1
CAPSULE ORAL
Qty: 30 CAPSULE | Refills: 0 | Status: SHIPPED | OUTPATIENT
Start: 2019-06-19 | End: 2019-08-24 | Stop reason: SDUPTHER

## 2019-08-24 DIAGNOSIS — F98.8 ATTENTION DEFICIT DISORDER, UNSPECIFIED HYPERACTIVITY PRESENCE: ICD-10-CM

## 2019-08-24 RX ORDER — LISDEXAMFETAMINE DIMESYLATE 70 MG/1
CAPSULE ORAL
Qty: 30 CAPSULE | Refills: 0 | Status: SHIPPED | OUTPATIENT
Start: 2019-08-24

## 2019-09-06 ENCOUNTER — TELEPHONE (OUTPATIENT)
Dept: INTERNAL MEDICINE | Facility: CLINIC | Age: 39
End: 2019-09-06

## 2019-09-06 NOTE — TELEPHONE ENCOUNTER
[facility] in [place] called for pa patient transferred all meds from [place] [facility] to [facility] in [facility] . They faxed to old fax number spoke with a ellie pharmacy tech and updated fax # and he verbalized that they will get pa sent maybe asif advised him that it would probably be Monday before we can get to it.aa

## 2019-09-06 NOTE — TELEPHONE ENCOUNTER
----- Message from Jutsine Sidhu sent at 9/6/2019 10:48 AM CDT -----  Contact: [facility]  The medication Desadenlasaxine needs a prior auth, please call her back at [phone]. Thank you

## 2019-09-13 ENCOUNTER — TELEPHONE (OUTPATIENT)
Dept: INTERNAL MEDICINE | Facility: CLINIC | Age: 39
End: 2019-09-13

## 2019-09-13 NOTE — TELEPHONE ENCOUNTER
----- Message from Meredith Kelley sent at 9/13/2019  2:09 PM CDT -----  Contact: [facility]- [phone]  would like to consult with the nurse, Patient would like her Thyroid  medication transfer to the [facility] Patient has moved out of Town Ms Iglesias would like to speak with the nurse concerning this Medication,  please call back at [phone], Thanks sj

## 2019-09-13 NOTE — TELEPHONE ENCOUNTER
Tried calling number listed in message and it was to a personal phone, not a pharmacy. Not able to speak with anyone about this. Tried calling the patient. No answer. Left message for her to return call.

## 2019-10-03 ENCOUNTER — PATIENT OUTREACH (OUTPATIENT)
Dept: ADMINISTRATIVE | Facility: HOSPITAL | Age: 39
End: 2019-10-03

## 2019-10-16 DIAGNOSIS — Z12.39 BREAST CANCER SCREENING: ICD-10-CM

## 2019-10-17 ENCOUNTER — TELEPHONE (OUTPATIENT)
Dept: ADMINISTRATIVE | Facility: HOSPITAL | Age: 39
End: 2019-10-17

## 2019-10-17 NOTE — TELEPHONE ENCOUNTER
Attempted to contact patient to schedule annual visit with PCP Dr Huyen Gay, voicemail left for patient to call back to schedule annual exam with PCP. PD

## 2019-11-11 RX ORDER — ESCITALOPRAM OXALATE 10 MG/1
TABLET ORAL
Qty: 30 TABLET | Refills: 0 | Status: SHIPPED | OUTPATIENT
Start: 2019-11-11 | End: 2019-12-05 | Stop reason: SDUPTHER

## 2019-12-05 RX ORDER — ESCITALOPRAM OXALATE 10 MG/1
TABLET ORAL
Qty: 30 TABLET | Refills: 0 | Status: SHIPPED | OUTPATIENT
Start: 2019-12-05 | End: 2020-01-02 | Stop reason: SDUPTHER

## 2020-01-02 RX ORDER — ESCITALOPRAM OXALATE 10 MG/1
TABLET ORAL
Qty: 30 TABLET | Refills: 0 | Status: SHIPPED | OUTPATIENT
Start: 2020-01-02

## 2020-03-09 DIAGNOSIS — E88.819 INSULIN RESISTANCE: ICD-10-CM

## 2020-03-09 RX ORDER — METFORMIN HYDROCHLORIDE 500 MG/1
1000 TABLET, EXTENDED RELEASE ORAL
Qty: 60 TABLET | Refills: 6 | OUTPATIENT
Start: 2020-03-09 | End: 2021-03-09

## 2020-10-06 ENCOUNTER — PATIENT MESSAGE (OUTPATIENT)
Dept: ADMINISTRATIVE | Facility: HOSPITAL | Age: 40
End: 2020-10-06

## 2021-02-18 ENCOUNTER — PATIENT OUTREACH (OUTPATIENT)
Dept: ADMINISTRATIVE | Facility: HOSPITAL | Age: 41
End: 2021-02-18